# Patient Record
Sex: FEMALE | Race: WHITE | Employment: OTHER | URBAN - METROPOLITAN AREA
[De-identification: names, ages, dates, MRNs, and addresses within clinical notes are randomized per-mention and may not be internally consistent; named-entity substitution may affect disease eponyms.]

---

## 2017-01-09 ENCOUNTER — TELEPHONE (OUTPATIENT)
Dept: FAMILY MEDICINE | Facility: CLINIC | Age: 82
End: 2017-01-09

## 2017-01-09 DIAGNOSIS — J45.30 MILD PERSISTENT ASTHMA WITHOUT COMPLICATION: Primary | ICD-10-CM

## 2017-01-09 NOTE — TELEPHONE ENCOUNTER
Please do not close this encounter until this has been addressed.  (prior auth approved/denied, prescriber refusal to complete prior auth or medication changed/discontinued)    Prior Authorization needed on: Asmanex 220mcg 120 doses  Drug NDC: [address]-63     Insurance: [employer] Part D  Member ID: [id number]   Insurance phone #: [phone]    Pharmacy NPI: [id number]  Pharmacy Phone #: [phone]  Pharmacy Fax #: [phone]    Please let us know if the PA gets approved or denied or if medication is changed    [facility] Pharmacy Technician  [facility]  [phone] fax [phone]

## 2017-01-13 RX ORDER — FLUTICASONE PROPIONATE 110 UG/1
2 AEROSOL, METERED RESPIRATORY (INHALATION) 2 TIMES DAILY
Qty: 1 INHALER | Refills: 3 | Status: SHIPPED | OUTPATIENT
Start: 2017-01-13 | End: 2017-05-10

## 2017-01-13 NOTE — TELEPHONE ENCOUNTER
KEEP ENCOUNTER OPEN UNTIL PA APPROVED/DENIED FROM INSURANCE  Patient's Insurance Co: [employer] part D  ID # [id number]  Phone: [phone]  Fax:     Medication and dose requested: Asmanex 220mcg 120 doses    Medications patient has tried and failed :       PA request form filed, fax[facility] and [facility].   Await approval or denial.

## 2017-01-13 NOTE — TELEPHONE ENCOUNTER
Spoke with patient informed her regarding PA denial and contacting insurance for any alternatives per notes below.  Patient should let us know of any alternatives.    CASANDRA Aquino

## 2017-01-13 NOTE — TELEPHONE ENCOUNTER
Pt spoke to ins and found out that the following rx  Is covered under her plan    Flovent Inhaler    [facility] - [place], [place] - [address], [address]

## 2017-01-13 NOTE — TELEPHONE ENCOUNTER
Please let pt know that I changed her asmanex to flovent due to coverage issues  She should ask the pharmacist how to correctly use this inhaler when she picks it up to make sure she knows how to use this right since it is different than the asmanex.  She will have to let me know if she has any trouble with this.

## 2017-01-13 NOTE — TELEPHONE ENCOUNTER
Dennise blakely,  Please call patient and ask her to contact insurance for a list of formulary alternatives

## 2017-02-15 ENCOUNTER — OFFICE VISIT (OUTPATIENT)
Dept: FAMILY MEDICINE | Facility: CLINIC | Age: 82
End: 2017-02-15
Payer: COMMERCIAL

## 2017-02-15 VITALS
BODY MASS INDEX: 27.05 KG/M2 | DIASTOLIC BLOOD PRESSURE: 62 MMHG | HEIGHT: 62 IN | WEIGHT: 147 LBS | TEMPERATURE: 97.7 F | HEART RATE: 66 BPM | OXYGEN SATURATION: 95 % | SYSTOLIC BLOOD PRESSURE: 138 MMHG

## 2017-02-15 DIAGNOSIS — E87.1 HYPONATREMIA: ICD-10-CM

## 2017-02-15 DIAGNOSIS — J45.40 MODERATE PERSISTENT ASTHMA WITHOUT COMPLICATION: Primary | ICD-10-CM

## 2017-02-15 PROCEDURE — 84295 ASSAY OF SERUM SODIUM: CPT | Performed by: INTERNAL MEDICINE

## 2017-02-15 PROCEDURE — 99213 OFFICE O/P EST LOW 20 MIN: CPT | Performed by: PHYSICIAN ASSISTANT

## 2017-02-15 PROCEDURE — 36415 COLL VENOUS BLD VENIPUNCTURE: CPT | Performed by: PHYSICIAN ASSISTANT

## 2017-02-15 RX ORDER — ALBUTEROL SULFATE 90 UG/1
2 AEROSOL, METERED RESPIRATORY (INHALATION) EVERY 6 HOURS PRN
Qty: 1 INHALER | Refills: 5 | Status: SHIPPED | OUTPATIENT
Start: 2017-02-15 | End: 2017-08-14

## 2017-02-15 NOTE — MR AVS SNAPSHOT
"              After Visit Summary   2/15/2017    Isaura Weathers    MRN: [id number]           Patient Information     Date Of Birth          1935        Visit Information        Provider Department      2/15/2017 11:00 AM Malissa Lee PA-C [facility]        Today's Diagnoses     Moderate persistent asthma without complication    -  1    Hyponatremia           Follow-ups after your visit        Who to contact     If you have questions or need follow up information about today's clinic visit or your schedule please contact [facility] directly at [phone].  Normal or non-critical lab and imaging results will be communicated to you by MyChart, letter or phone within 4 business days after the clinic has received the results. If you do not hear from us within 7 days, please contact the clinic through MyChart or phone. If you have a critical or abnormal lab result, we will notify you by phone as soon as possible.  Submit refill requests through [employer] or call your pharmacy and they will forward the refill request to us. Please allow 3 business days for your refill to be completed.          Additional Information About Your Visit        Care EveryWhere ID     This is your Care EveryWhere ID. This could be used by other organizations to access your [facility] medical records  [id number]        Your Vitals Were     Pulse Temperature Height Pulse Oximetry BMI (Body Mass Index)       66 97.7  F (36.5  C) (Oral) 5' 2\" (1.575 m) 95% 26.89 kg/m2        Blood Pressure from Last 3 Encounters:   02/15/17 138/62   11/28/16 132/71   02/29/16 102/56    Weight from Last 3 Encounters:   02/15/17 147 lb (66.7 kg)   11/28/16 140 lb (63.5 kg)   02/22/16 122 lb (55.3 kg)              We Performed the Following     Asthma Action Plan (AAP)          Where to get your medicines      These medications were sent to [facility], [place] - [address]  [address], [facility] [address]     Phone:  [phone]     albuterol 108 (90 BASE) MCG/ACT Inhaler          Primary Care Provider Office Phone # Fax #    Mlaissa Lee PA-C [phone]       [facility] [address]  [facility] [address]        Thank you!     Thank you for choosing [facility]  for your care. Our goal is always to provide you with excellent care. Hearing back from our patients is one way we can continue to improve our services. Please take a few minutes to complete the written survey that you may receive in the mail after your visit with us. Thank you!             Your Updated Medication List - Protect others around you: Learn how to safely use, store and throw away your medicines at www.disposemymeds.org.          This list is accurate as of: 2/15/17 11:14 AM.  Always use your most recent med list.                   Brand Name Dispense Instructions for use    albuterol 108 (90 BASE) MCG/ACT Inhaler    PROAIR HFA/PROVENTIL HFA/VENTOLIN HFA    1 Inhaler    Inhale 2 puffs into the lungs every 6 hours as needed for shortness of breath / dyspnea or wheezing       atenolol 50 MG tablet    TENORMIN    90 tablet    Take 1 tablet (50 mg) by mouth daily       fluticasone 110 MCG/ACT Inhaler    FLOVENT HFA    1 Inhaler    Inhale 2 puffs into the lungs 2 times daily       hydrochlorothiazide 25 MG tablet    HYDRODIURIL     Take 0.5 tablets (12.5 mg) by mouth daily       levothyroxine 50 MCG tablet    SYNTHROID/LEVOTHROID    90 tablet    Take 1 tablet (50 mcg) by mouth daily

## 2017-02-15 NOTE — LETTER
[place] [address]  [address]  Tel: [phone]    February 16, 2017    Isaura BLAKE Sarahy  [address] [place] [address]        Dear MsKiel Sarahy,    We left a message on your home phone that the results of your recent sodium is now normal.     If you have any further questions or problems, please contact our office.      Sincerely,    Malissa Lee PA-C / monique       Results for orders placed or performed in visit on 02/15/17   Sodium   Result Value Ref Range    Sodium 134 133 - 144 mmol/L

## 2017-02-15 NOTE — NURSING NOTE
"Chief Complaint   Patient presents with     Follow Up For     Asthma, Hypertension, Hypothyroid       Initial /66 (BP Location: Right arm, Patient Position: Chair, Cuff Size: Adult Large)  Pulse 66  Temp 97.7  F (36.5  C) (Oral)  Ht 5' 2\" (1.575 m)  Wt 147 lb (66.7 kg)  SpO2 95%  BMI 26.89 kg/m2 Estimated body mass index is 26.89 kg/(m^2) as calculated from the following:    Height as of this encounter: 5' 2\" (1.575 m).    Weight as of this encounter: 147 lb (66.7 kg).  Medication Reconciliation: complete.  Kindra Lees CMA      "

## 2017-02-15 NOTE — PROGRESS NOTES
HPI: 82 yo here for f/u asthma and HTN  Her insurance stopped covering Asmanex so she was switched to Flovent and notes her asthma is improved with less of the nighttime cough  She is using her albuterol inh prn pascual if outside    She had low sodium so we decr her hctz to 1/2 tab  BP at home was 127/80 at [facility] on the lower dose of HCTZ  She is feeling well without concerns.    Past Medical History   Diagnosis Date     Ankle fracture      HTN (hypertension), benign      Hyperlipidemia LDL goal < 130 1/27/2014     Hypothyroid      Moderate persistent asthma      Pneumonia      Rib fracture      from coughing     SBO (small bowel obstruction) (H) 2/2016     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     Past Surgical History   Procedure Laterality Date     Laparoscopy diagnostic (general) N/A 2/22/2016     Procedure: LAPAROSCOPY DIAGNOSTIC (GENERAL);  Surgeon: Phill Hansen MD;  Location: [facility] OR     Laparotomy, lysis adhesions, combined N/A 2/22/2016     Procedure: COMBINED LAPAROTOMY, LYSIS ADHESIONS;  Surgeon: Phill Hansen MD;  Location:  OR     Resect small bowel without ostomy N/A 2/22/2016     Procedure: RESECT SMALL BOWEL WITHOUT OSTOMY;  Surgeon: Phill Hansen MD;  Location:  OR     Social History   Substance Use Topics     Smoking status: Never Smoker     Smokeless tobacco: Never Used     Alcohol use 0.0 - 0.6 oz/week     0 - 1 Standard drinks or equivalent per week      Comment: one/d     Current Outpatient Prescriptions   Medication Sig Dispense Refill     albuterol (PROAIR HFA/PROVENTIL HFA/VENTOLIN HFA) 108 (90 BASE) MCG/ACT Inhaler Inhale 2 puffs into the lungs every 6 hours as needed for shortness of breath / dyspnea or wheezing 1 Inhaler 5     fluticasone (FLOVENT HFA) 110 MCG/ACT Inhaler Inhale 2 puffs into the lungs 2 times daily 1 Inhaler 3     hydrochlorothiazide (HYDRODIURIL) 25 MG tablet Take 0.5 tablets (12.5 mg) by mouth daily       atenolol (TENORMIN) 50 MG tablet  "Take 1 tablet (50 mg) by mouth daily 90 tablet 3     levothyroxine (SYNTHROID, LEVOTHROID) 50 MCG tablet Take 1 tablet (50 mcg) by mouth daily 90 tablet 3     [DISCONTINUED] albuterol (PROAIR HFA, PROVENTIL HFA, VENTOLIN HFA) 108 (90 BASE) MCG/ACT inhaler Inhale 2 puffs into the lungs every 6 hours as needed for shortness of breath / dyspnea or wheezing 1 Inhaler 5     Allergies   Allergen Reactions     Benicar [Olmesartan Medoxomil]      Robitussin Cough-Allergy      Pt does not recall a problem      FAMILY HISTORY NOTED AND REVIEWED  PHYSICAL EXAM:    /62  Pulse 66  Temp 97.7  F (36.5  C) (Oral)  Ht 5' 2\" (1.575 m)  Wt 147 lb (66.7 kg)  SpO2 95%  BMI 26.89 kg/m2    Patient appears non toxic    Assessment and Plan:     (J45.40) Moderate persistent asthma without complication  (primary encounter diagnosis)  Comment: asthma control even better with switch from Asmanex to Flovent.  Plan: albuterol (PROAIR HFA/PROVENTIL HFA/VENTOLIN         HFA) 108 (90 BASE) MCG/ACT Inhaler            (E87.1) Hyponatremia  Comment: BP well controlled on lower dose of hctz so cont same dose for now.  Plan: Na today      Malissa Lee PA-C      "

## 2017-02-16 LAB — SODIUM SERPL-SCNC: 134 MMOL/L (ref 133–144)

## 2017-02-16 ASSESSMENT — ASTHMA QUESTIONNAIRES: ACT_TOTALSCORE: 21

## 2017-03-22 ENCOUNTER — OFFICE VISIT (OUTPATIENT)
Dept: FAMILY MEDICINE | Facility: CLINIC | Age: 82
End: 2017-03-22
Payer: COMMERCIAL

## 2017-03-22 VITALS
HEART RATE: 61 BPM | TEMPERATURE: 97 F | SYSTOLIC BLOOD PRESSURE: 138 MMHG | OXYGEN SATURATION: 96 % | BODY MASS INDEX: 26.87 KG/M2 | DIASTOLIC BLOOD PRESSURE: 67 MMHG | WEIGHT: 146 LBS | HEIGHT: 62 IN

## 2017-03-22 DIAGNOSIS — I10 HTN (HYPERTENSION), BENIGN: ICD-10-CM

## 2017-03-22 PROCEDURE — 99212 OFFICE O/P EST SF 10 MIN: CPT | Performed by: PHYSICIAN ASSISTANT

## 2017-03-22 RX ORDER — HYDROCHLOROTHIAZIDE 25 MG/1
12.5 TABLET ORAL DAILY
Qty: 45 TABLET | Refills: 3 | Status: SHIPPED | OUTPATIENT
Start: 2017-03-22 | End: 2018-04-05

## 2017-03-22 NOTE — NURSING NOTE
"Chief Complaint   Patient presents with     Follow Up For     Moderate persistent asthma without complication        Initial /67 (BP Location: Right arm, Patient Position: Chair, Cuff Size: Adult Large)  Pulse 61  Temp 97  F (36.1  C) (Oral)  Ht 5' 2\" (1.575 m)  Wt 146 lb (66.2 kg)  SpO2 96%  BMI 26.7 kg/m2 Estimated body mass index is 26.7 kg/(m^2) as calculated from the following:    Height as of this encounter: 5' 2\" (1.575 m).    Weight as of this encounter: 146 lb (66.2 kg).  Medication Reconciliation: complete.      Kindra Lees CMA      "

## 2017-03-22 NOTE — MR AVS SNAPSHOT
"              After Visit Summary   3/22/2017    Isaura Weathers    MRN: [id number]           Patient Information     Date Of Birth          1935        Visit Information        Provider Department      3/22/2017 1:30 PM Malissa Lee PA-C [facility]        Today's Diagnoses     HTN (hypertension), benign           Follow-ups after your visit        Who to contact     If you have questions or need follow up information about today's clinic visit or your schedule please contact [facility] directly at [phone].  Normal or non-critical lab and imaging results will be communicated to you by MyChart, letter or phone within 4 business days after the clinic has received the results. If you do not hear from us within 7 days, please contact the clinic through MyChart or phone. If you have a critical or abnormal lab result, we will notify you by phone as soon as possible.  Submit refill requests through [employer] or call your pharmacy and they will forward the refill request to us. Please allow 3 business days for your refill to be completed.          Additional Information About Your Visit        Care EveryWhere ID     This is your Care EveryWhere ID. This could be used by other organizations to access your [facility] medical records  [id number]        Your Vitals Were     Pulse Temperature Height Pulse Oximetry BMI (Body Mass Index)       61 97  F (36.1  C) (Oral) 5' 2\" (1.575 m) 96% 26.7 kg/m2        Blood Pressure from Last 3 Encounters:   03/22/17 138/67   02/15/17 138/62   11/28/16 132/71    Weight from Last 3 Encounters:   03/22/17 146 lb (66.2 kg)   02/15/17 147 lb (66.7 kg)   11/28/16 140 lb (63.5 kg)              Today, you had the following     No orders found for display         Where to get your medicines      These medications were sent to [facility] [place], [place] - [address], [address]  [address], [place] [address]     Phone:  " [phone]     hydrochlorothiazide 25 MG tablet          Primary Care Provider Office Phone # Fax #    Malissa Lee PA-C [phone]       [facility] [address]  [facility] [address]        Thank you!     Thank you for choosing [facility]  for your care. Our goal is always to provide you with excellent care. Hearing back from our patients is one way we can continue to improve our services. Please take a few minutes to complete the written survey that you may receive in the mail after your visit with us. Thank you!             Your Updated Medication List - Protect others around you: Learn how to safely use, store and throw away your medicines at www.disposemymeds.org.          This list is accurate as of: 3/22/17  1:33 PM.  Always use your most recent med list.                   Brand Name Dispense Instructions for use    albuterol 108 (90 BASE) MCG/ACT Inhaler    PROAIR HFA/PROVENTIL HFA/VENTOLIN HFA    1 Inhaler    Inhale 2 puffs into the lungs every 6 hours as needed for shortness of breath / dyspnea or wheezing       atenolol 50 MG tablet    TENORMIN    90 tablet    Take 1 tablet (50 mg) by mouth daily       fluticasone 110 MCG/ACT Inhaler    FLOVENT HFA    1 Inhaler    Inhale 2 puffs into the lungs 2 times daily       hydrochlorothiazide 25 MG tablet    HYDRODIURIL    45 tablet    Take 0.5 tablets (12.5 mg) by mouth daily       levothyroxine 50 MCG tablet    SYNTHROID/LEVOTHROID    90 tablet    Take 1 tablet (50 mcg) by mouth daily

## 2017-03-22 NOTE — PROGRESS NOTES
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     Past Surgical History:   Procedure Laterality Date     LAPAROSCOPY DIAGNOSTIC (GENERAL) N/A 2/22/2016    Procedure: LAPAROSCOPY DIAGNOSTIC (GENERAL);  Surgeon: Phill Hansen MD;  Location: [facility] OR     LAPAROTOMY, LYSIS ADHESIONS, COMBINED N/A 2/22/2016    Procedure: COMBINED LAPAROTOMY, LYSIS ADHESIONS;  Surgeon: Phill Hansen MD;  Location: [facility] OR     RESECT SMALL BOWEL WITHOUT OSTOMY N/A 2/22/2016    Procedure: RESECT SMALL BOWEL WITHOUT OSTOMY;  Surgeon: Phill Hansen MD;  Location:  OR     Social History   Substance Use Topics     Smoking status: Never Smoker     Smokeless tobacco: Never Used     Alcohol use 0.0 - 0.6 oz/week     0 - 1 Standard drinks or equivalent per week      Comment: one/d     Current Outpatient Prescriptions   Medication Sig Dispense Refill     hydrochlorothiazide (HYDRODIURIL) 25 MG tablet Take 0.5 tablets (12.5 mg) by mouth daily 45 tablet 3     albuterol (PROAIR HFA/PROVENTIL HFA/VENTOLIN HFA) 108 (90 BASE) MCG/ACT Inhaler Inhale 2 puffs into the lungs every 6 hours as needed for shortness of breath / dyspnea or wheezing 1 Inhaler 5     fluticasone (FLOVENT HFA) 110 MCG/ACT Inhaler Inhale 2 puffs into the lungs 2 times daily 1 Inhaler 3     atenolol (TENORMIN) 50 MG tablet Take 1 tablet (50 mg) by mouth daily 90 tablet 3     levothyroxine (SYNTHROID, LEVOTHROID) 50 MCG tablet Take 1 tablet (50 mcg) by mouth daily 90 tablet 3      "[DISCONTINUED] hydrochlorothiazide (HYDRODIURIL) 25 MG tablet Take 0.5 tablets (12.5 mg) by mouth daily       Allergies   Allergen Reactions     Benicar [Olmesartan Medoxomil]      Robitussin Cough-Allergy      Pt does not recall a problem        PHYSICAL EXAM:    /67 (BP Location: Right arm, Patient Position: Chair, Cuff Size: Adult Large)  Pulse 61  Temp 97  F (36.1  C) (Oral)  Ht 5' 2\" (1.575 m)  Wt 146 lb (66.2 kg)  SpO2 96%  BMI 26.7 kg/m2    Patient appears non toxic    Assessment and Plan:     (I10) HTN (hypertension), benign  Comment: well controlled, cont same  Plan: hydrochlorothiazide (HYDRODIURIL) 25 MG tablet        refilled      Malissa Lee PA-C        "

## 2017-03-23 ASSESSMENT — ASTHMA QUESTIONNAIRES: ACT_TOTALSCORE: 23

## 2017-05-10 DIAGNOSIS — J45.30 MILD PERSISTENT ASTHMA WITHOUT COMPLICATION: ICD-10-CM

## 2017-05-10 RX ORDER — DEXAMETHASONE 4 MG/1
TABLET ORAL
Qty: 12 G | Refills: 3 | Status: SHIPPED | OUTPATIENT
Start: 2017-05-10 | End: 2017-09-14

## 2017-05-10 NOTE — TELEPHONE ENCOUNTER
Prescription approved per [facility] Refill Protocol.  Nikole Barone RN

## 2017-05-10 NOTE — TELEPHONE ENCOUNTER
Pending Prescriptions:                       Disp   Refills    FLOVENT  MCG/ACT Inhaler [Pharmacy *12 g   3        Sig: INHALE 2 PUFFS INTO THE LUNGS TWO TIMES A DAY           Last Written Prescription Date: 01/13/2017  Last Fill Quantity: 1, # refills: 3    Last Office Visit with [facility], [facility] or [facility] prescribing provider:  03/22/2017   Future Office Visit:       Date of Last Asthma Action Plan Letter:   Asthma Action Plan Q1 Year    Topic Date Due     Asthma Action Plan - yearly  02/15/2018      Asthma Control Test:   ACT Total Scores 3/22/2017   ACT TOTAL SCORE (Goal Greater than or Equal to 20) 23   In the past 12 months, how many times did you visit the emergency room for your asthma without being admitted to the hospital? 0   In the past 12 months, how many times were you hospitalized overnight because of your asthma? 0       Date of Last Spirometry Test:   No results found for this or any previous visit.

## 2017-07-13 ENCOUNTER — OFFICE VISIT (OUTPATIENT)
Dept: FAMILY MEDICINE | Facility: CLINIC | Age: 82
End: 2017-07-13
Payer: COMMERCIAL

## 2017-07-13 VITALS
BODY MASS INDEX: 27.38 KG/M2 | HEIGHT: 62 IN | HEART RATE: 61 BPM | OXYGEN SATURATION: 96 % | TEMPERATURE: 97.2 F | WEIGHT: 148.8 LBS | SYSTOLIC BLOOD PRESSURE: 126 MMHG | DIASTOLIC BLOOD PRESSURE: 60 MMHG

## 2017-07-13 DIAGNOSIS — I10 HTN (HYPERTENSION), BENIGN: ICD-10-CM

## 2017-07-13 DIAGNOSIS — J45.40 MODERATE PERSISTENT ASTHMA WITHOUT COMPLICATION: Primary | ICD-10-CM

## 2017-07-13 DIAGNOSIS — E03.9 HYPOTHYROIDISM, UNSPECIFIED TYPE: ICD-10-CM

## 2017-07-13 PROCEDURE — 84443 ASSAY THYROID STIM HORMONE: CPT | Performed by: PHYSICIAN ASSISTANT

## 2017-07-13 PROCEDURE — 36415 COLL VENOUS BLD VENIPUNCTURE: CPT | Performed by: PHYSICIAN ASSISTANT

## 2017-07-13 PROCEDURE — 80048 BASIC METABOLIC PNL TOTAL CA: CPT | Performed by: PHYSICIAN ASSISTANT

## 2017-07-13 PROCEDURE — 99214 OFFICE O/P EST MOD 30 MIN: CPT | Performed by: PHYSICIAN ASSISTANT

## 2017-07-13 NOTE — NURSING NOTE
"Chief Complaint   Patient presents with     Asthma        Initial /65 (BP Location: Right arm, Patient Position: Chair, Cuff Size: Adult Regular)  Pulse 61  Temp 97.2  F (36.2  C) (Oral)  Ht 5' 2\" (1.575 m)  Wt 148 lb 12.8 oz (67.5 kg)  SpO2 96%  BMI 27.22 kg/m2 Estimated body mass index is 27.22 kg/(m^2) as calculated from the following:    Height as of this encounter: 5' 2\" (1.575 m).    Weight as of this encounter: 148 lb 12.8 oz (67.5 kg)..    BP completed using cuff size: regular  MEDICATIONS REVIEWED  SOCIAL AND FAMILY HX REVIEWED  Claudette Farah CMA  "

## 2017-07-13 NOTE — LETTER
[facility]  [address] [facility] [address]  [place]  [address]  Tel: [phone]    July 17, 2017    Isaura BLAKE Luistiki  [address] [place] [phone]        Dear Ms. Weathers,     It was a pleasure seeing you.  I wanted to get back to you with your test results.  I have enclosed a copy for your records.    Your thyroid (TSH), blood sugar, electrolytes, kidney functions were all normal.    Malissa Lee PA-C/ monique      Results for orders placed or performed in visit on 07/13/17   TSH with free T4 reflex   Result Value Ref Range    TSH 1.87 0.40 - 4.00 mU/L   Basic metabolic panel   Result Value Ref Range    Sodium 135 133 - 144 mmol/L    Potassium 3.8 3.4 - 5.3 mmol/L    Chloride 97 94 - 109 mmol/L    Carbon Dioxide 28 20 - 32 mmol/L    Anion Gap 10 3 - 14 mmol/L    Glucose 91 70 - 99 mg/dL    Urea Nitrogen 8 7 - 30 mg/dL    Creatinine 0.67 0.52 - 1.04 mg/dL    GFR Estimate 85 >60 mL/min/1.7m2    GFR Estimate If Black >90   GFR Calc   >60 mL/min/1.7m2    Calcium 9.3 8.5 - 10.1 mg/dL

## 2017-07-13 NOTE — MR AVS SNAPSHOT
"              After Visit Summary   2017    Isaura Weathers    MRN: [id number]           Patient Information     Date Of Birth          1935        Visit Information        Provider Department      2017 1:00 PM Malissa Lee PA-C [facility]        Today's Diagnoses     Moderate persistent asthma without complication    -  1    HTN (hypertension), benign        Hypothyroidism, unspecified type           Follow-ups after your visit        Who to contact     If you have questions or need follow up information about today's clinic visit or your schedule please contact [facility] directly at [phone].  Normal or non-critical lab and imaging results will be communicated to you by [employer]hart, letter or phone within 4 business days after the clinic has received the results. If you do not hear from us within 7 days, please contact the clinic through [employer]hart or phone. If you have a critical or abnormal lab result, we will notify you by phone as soon as possible.  Submit refill requests through Segment or call your pharmacy and they will forward the refill request to us. Please allow 3 business days for your refill to be completed.          Additional Information About Your Visit        MyChart Information     Segment lets you send messages to your doctor, view your test results, renew your prescriptions, schedule appointments and more. To sign up, go to [email or web address] . Click on \"Log in\" on the left side of the screen, which will take you to the Welcome page. Then click on \"Sign up Now\" on the right side of the page.     You will be asked to enter the access code listed below, as well as some personal information. Please follow the directions to create your username and password.     Your access code is: [email or web address]  Expires: 10/11/2017  1:21 PM     Your access code will  in 90 days. If you need help or a new code, please call your [facility] or [phone].   " "     Care EveryWhere ID     This is your Care EveryWhere ID. This could be used by other organizations to access your [facility] medical records  [id number]        Your Vitals Were     Pulse Temperature Height Pulse Oximetry BMI (Body Mass Index)       61 97.2  F (36.2  C) (Oral) 5' 2\" (1.575 m) 96% 27.22 kg/m2        Blood Pressure from Last 3 Encounters:   07/13/17 126/60   03/22/17 138/67   02/15/17 138/62    Weight from Last 3 Encounters:   07/13/17 148 lb 12.8 oz (67.5 kg)   03/22/17 146 lb (66.2 kg)   02/15/17 147 lb (66.7 kg)              We Performed the Following     Basic metabolic panel     TSH with free T4 reflex        Primary Care Provider Office Phone # Fax #    Malissa Lee PA-C [phone]       [facility] [address]  [facility] [address]        Equal Access to Services     TAWANA DIANE : Hadii aad ku hadasho Soomaali, waaxda luqadaha, qaybta kaalmada adeegyada, waxay idiin hayvillan ramón martins . So [facility] [phone].    ATENCIÓN: Si habla español, tiene a sexton disposición servicios gratuitos de asistencia lingüística. [facility] [phone].    We comply with applicable federal civil rights laws and [place] laws. We do not discriminate on the basis of race, color, national origin, age, disability sex, sexual orientation or gender identity.            Thank you!     Thank you for choosing [facility]  for your care. Our goal is always to provide you with excellent care. Hearing back from our patients is one way we can continue to improve our services. Please take a few minutes to complete the written survey that you may receive in the mail after your visit with us. Thank you!             Your Updated Medication List - Protect others around you: Learn how to safely use, store and throw away your medicines at www.disposemymeds.org.          This list is accurate as of: 7/13/17  1:21 PM.  Always use your most recent med list.                   " Brand Name Dispense Instructions for use Diagnosis    albuterol 108 (90 BASE) MCG/ACT Inhaler    PROAIR HFA/PROVENTIL HFA/VENTOLIN HFA    1 Inhaler    Inhale 2 puffs into the lungs every 6 hours as needed for shortness of breath / dyspnea or wheezing    Moderate persistent asthma without complication       atenolol 50 MG tablet    TENORMIN    90 tablet    Take 1 tablet (50 mg) by mouth daily    HTN (hypertension), benign       FLOVENT  MCG/ACT Inhaler   Generic drug:  fluticasone     12 g    INHALE 2 PUFFS INTO THE LUNGS TWO TIMES A DAY    Mild persistent asthma without complication       hydrochlorothiazide 25 MG tablet    HYDRODIURIL    45 tablet    Take 0.5 tablets (12.5 mg) by mouth daily    HTN (hypertension), benign       levothyroxine 50 MCG tablet    SYNTHROID/LEVOTHROID    90 tablet    Take 1 tablet (50 mcg) by mouth daily    Hypothyroidism, unspecified type

## 2017-07-13 NOTE — PROGRESS NOTES
HPI: 82 yo female here for f/u asthma and HTN  She is compliant with taking her medications and feels well  Denies sob, wheezing or cough  She only needs to use her rescue inhaler if out in the wind  She has more pedal edema since the hctz dose was decr due to hyponatremia  She brings in a list of BP readings which shows good BP control.    Past Medical History:   Diagnosis Date     Ankle fracture      HTN (hypertension), benign      Hyperlipidemia LDL goal < 130 1/27/2014     Hypothyroid      Moderate persistent asthma      Pneumonia      Rib fracture     from coughing     SBO (small bowel obstruction) (H) 2/2016    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     Past Surgical History:   Procedure Laterality Date     LAPAROSCOPY DIAGNOSTIC (GENERAL) N/A 2/22/2016    Procedure: LAPAROSCOPY DIAGNOSTIC (GENERAL);  Surgeon: Phill Hansen MD;  Location: [facility] OR     LAPAROTOMY, LYSIS ADHESIONS, COMBINED N/A 2/22/2016    Procedure: COMBINED LAPAROTOMY, LYSIS ADHESIONS;  Surgeon: Phill Hansen MD;  Location:  OR     RESECT SMALL BOWEL WITHOUT OSTOMY N/A 2/22/2016    Procedure: RESECT SMALL BOWEL WITHOUT OSTOMY;  Surgeon: Phill Hansen MD;  Location:  OR     Social History   Substance Use Topics     Smoking status: Never Smoker     Smokeless tobacco: Never Used     Alcohol use 0.0 - 0.6 oz/week     0 - 1 Standard drinks or equivalent per week      Comment: one/d     Current Outpatient Prescriptions   Medication Sig Dispense Refill     FLOVENT  MCG/ACT Inhaler INHALE 2 PUFFS INTO THE LUNGS TWO TIMES A DAY 12 g 3     hydrochlorothiazide (HYDRODIURIL) 25 MG tablet Take 0.5 tablets (12.5 mg) by mouth daily 45 tablet 3     albuterol (PROAIR HFA/PROVENTIL HFA/VENTOLIN HFA) 108 (90 BASE) MCG/ACT Inhaler Inhale 2 puffs into the lungs every 6 hours as needed for shortness of breath / dyspnea or wheezing 1 Inhaler 5     atenolol (TENORMIN) 50 MG tablet Take 1 tablet (50 mg) by mouth daily 90  "tablet 3     levothyroxine (SYNTHROID, LEVOTHROID) 50 MCG tablet Take 1 tablet (50 mcg) by mouth daily 90 tablet 3     Allergies   Allergen Reactions     Benicar [Olmesartan Medoxomil]      Robitussin Cough-Allergy      Pt does not recall a problem        PHYSICAL EXAM:    /60  Pulse 61  Temp 97.2  F (36.2  C) (Oral)  Ht 5' 2\" (1.575 m)  Wt 148 lb 12.8 oz (67.5 kg)  SpO2 96%  BMI 27.22 kg/m2    Patient appears non toxic  Lungs: CTA bilat without wheezing or rhonchi  Heart: RRR without m/r/g.  Extr: trace pitting pedal edema bilat  Psych: approp affect and mood.    Assessment and Plan:     (J45.40) Moderate persistent asthma without complication  (primary encounter diagnosis)  Comment: stable  Plan:  Cont same. ACT done today    (I10) HTN (hypertension), benign  Comment: well controlled on recheck, cont same.  Plan: Basic metabolic panel            (E03.9) Hypothyroidism, unspecified type  Comment:   Plan: TSH with free T4 reflex              Malissa Lee PA-C        "

## 2017-07-14 LAB
ANION GAP SERPL CALCULATED.3IONS-SCNC: 10 MMOL/L (ref 3–14)
BUN SERPL-MCNC: 8 MG/DL (ref 7–30)
CALCIUM SERPL-MCNC: 9.3 MG/DL (ref 8.5–10.1)
CHLORIDE SERPL-SCNC: 97 MMOL/L (ref 94–109)
CO2 SERPL-SCNC: 28 MMOL/L (ref 20–32)
CREAT SERPL-MCNC: 0.67 MG/DL (ref 0.52–1.04)
GFR SERPL CREATININE-BSD FRML MDRD: 85 ML/MIN/1.7M2
GLUCOSE SERPL-MCNC: 91 MG/DL (ref 70–99)
POTASSIUM SERPL-SCNC: 3.8 MMOL/L (ref 3.4–5.3)
SODIUM SERPL-SCNC: 135 MMOL/L (ref 133–144)
TSH SERPL DL<=0.005 MIU/L-ACNC: 1.87 MU/L (ref 0.4–4)

## 2017-07-14 ASSESSMENT — ASTHMA QUESTIONNAIRES: ACT_TOTALSCORE: 23

## 2017-07-15 NOTE — PROGRESS NOTES
It was a pleasure seeing you.  I wanted to get back to you with your test results.  I have enclosed a copy for your records.    Your thyroid (TSH), blood sugar, electrolytes, kidney functions were all normal.    Malissa Lee PA-C

## 2017-08-14 DIAGNOSIS — J45.40 MODERATE PERSISTENT ASTHMA WITHOUT COMPLICATION: ICD-10-CM

## 2017-08-14 NOTE — TELEPHONE ENCOUNTER
albuterol (PROAIR HFA/PROVENTIL HFA/VENTOLIN HFA) 108 (90 BASE) MCG/ACT Inhaler 1 Inhaler 5 2/15/2017  No   Sig: Inhale 2 puffs into the lungs every 6 hours as needed for shortness of breath / dyspnea or wheezing            Last Written Prescription Date: 02/15/2017  Last Fill Quantity: 1 , # refills: 1    Last Office Visit with [facility], [facility] or [facility] prescribing provider:  07/13/2017   Future Office Visit:       Date of Last Asthma Action Plan Letter:   Asthma Action Plan Q1 Year    Topic Date Due     Asthma Action Plan - yearly  02/15/2018      Asthma Control Test:   ACT Total Scores 7/13/2017   ACT TOTAL SCORE -   ASTHMA ER VISITS -   ASTHMA HOSPITALIZATIONS -   ACT TOTAL SCORE (Goal Greater than or Equal to 20) 23   In the past 12 months, how many times did you visit the emergency room for your asthma without being admitted to the hospital? 0   In the past 12 months, how many times were you hospitalized overnight because of your asthma? 0       Date of Last Spirometry Test:   No results found for this or any previous visit.

## 2017-08-15 RX ORDER — ALBUTEROL SULFATE 90 UG/1
AEROSOL, METERED RESPIRATORY (INHALATION)
Qty: 18 G | Refills: 4 | Status: SHIPPED | OUTPATIENT
Start: 2017-08-15 | End: 2018-10-09

## 2017-08-15 NOTE — TELEPHONE ENCOUNTER
Prescription approved per [facility] Refill Protocol.    Taylor Napier RN   [facility]

## 2017-09-14 DIAGNOSIS — J45.30 MILD PERSISTENT ASTHMA WITHOUT COMPLICATION: ICD-10-CM

## 2017-09-14 RX ORDER — DEXAMETHASONE 4 MG/1
TABLET ORAL
Qty: 12 G | Refills: 1 | Status: SHIPPED | OUTPATIENT
Start: 2017-09-14 | End: 2017-11-13

## 2017-09-14 NOTE — TELEPHONE ENCOUNTER
FLOVENT  MCG/ACT Inhaler         Last Written Prescription Date: 5/10/2017  Last Fill Quantity: 12g, # refills: 3    Last Office Visit with [facility], [facility] or [facility] prescribing provider:  7/13/2017   Future Office Visit:       Date of Last Asthma Action Plan Letter:   Asthma Action Plan Q1 Year    Topic Date Due     Asthma Action Plan - yearly  02/15/2018      Asthma Control Test:   ACT Total Scores 7/13/2017   ACT TOTAL SCORE -   ASTHMA ER VISITS -   ASTHMA HOSPITALIZATIONS -   ACT TOTAL SCORE (Goal Greater than or Equal to 20) 23   In the past 12 months, how many times did you visit the emergency room for your asthma without being admitted to the hospital? 0   In the past 12 months, how many times were you hospitalized overnight because of your asthma? 0       Date of Last Spirometry Test:   No results found for this or any previous visit.

## 2017-09-14 NOTE — TELEPHONE ENCOUNTER
Prescription approved per [facility] Refill Protocol.    Diana العلي RN

## 2017-11-13 DIAGNOSIS — J45.30 MILD PERSISTENT ASTHMA WITHOUT COMPLICATION: ICD-10-CM

## 2017-11-15 RX ORDER — DEXAMETHASONE 4 MG/1
TABLET ORAL
Qty: 3 INHALER | Refills: 1 | Status: SHIPPED | OUTPATIENT
Start: 2017-11-15 | End: 2018-10-09

## 2017-11-15 NOTE — TELEPHONE ENCOUNTER
Requested Prescriptions   Pending Prescriptions Disp Refills     FLOVENT  MCG/ACT Inhaler [Pharmacy Med Name: FLOVENT HFA 110MCG/ACT AERO] 12 g 1     Sig: INHALE TWO PUFFS INTO THE LUNGS TWO TIMES A DAY    There is no refill protocol information for this order            Date of Last Asthma Action Plan Letter:   Asthma Action Plan Q1 Year    Topic Date Due     Asthma Action Plan - yearly  02/15/2018      Asthma Control Test:   ACT Total Scores 7/13/2017   ACT TOTAL SCORE -   ASTHMA ER VISITS -   ASTHMA HOSPITALIZATIONS -   ACT TOTAL SCORE (Goal Greater than or Equal to 20) 23   In the past 12 months, how many times did you visit the emergency room for your asthma without being admitted to the hospital? 0   In the past 12 months, how many times were you hospitalized overnight because of your asthma? 0       Date of Last Spirometry Test:   No results found for this or any previous visit.        Prescription approved per [facility] Refill Protocol.    Diana Gamboa, BS, RN, PHN  [facility] [phone]

## 2018-02-16 ENCOUNTER — TELEPHONE (OUTPATIENT)
Dept: FAMILY MEDICINE | Facility: CLINIC | Age: 83
End: 2018-02-16

## 2018-02-17 ASSESSMENT — ASTHMA QUESTIONNAIRES: ACT_TOTALSCORE: 25

## 2018-04-05 DIAGNOSIS — I10 HTN (HYPERTENSION), BENIGN: ICD-10-CM

## 2018-04-05 RX ORDER — HYDROCHLOROTHIAZIDE 25 MG/1
TABLET ORAL
Qty: 45 TABLET | Refills: 0 | Status: SHIPPED | OUTPATIENT
Start: 2018-04-05 | End: 2018-06-22

## 2018-04-05 RX ORDER — ATENOLOL 50 MG/1
TABLET ORAL
Qty: 90 TABLET | Refills: 0 | Status: SHIPPED | OUTPATIENT
Start: 2018-04-05 | End: 2018-07-05

## 2018-04-05 NOTE — TELEPHONE ENCOUNTER
Prescription approved per [facility] Refill Protocol.  Ramona Mauro RN

## 2018-04-05 NOTE — TELEPHONE ENCOUNTER
"Atenolol (TENORMIN)    Last Written Prescription Date:  11/28/16  Last Fill Quantity: 90 tablets,  # refills: 3   Last office visit: 7/13/2017 with prescribing provider:  Malissa Chaudhari Office Visit:      Hydrochlorothiazide (HYDRODIURIL)    Last Written Prescription Date:  3/22/17  Last Fill Quantity: 45 tablets,  # refills: 3   Last office visit: 7/13/2017 with prescribing provider:  Malissa Chaudhari Office Visit:      Requested Prescriptions   Pending Prescriptions Disp Refills     atenolol (TENORMIN) 50 MG tablet [Pharmacy Med Name: ATENOLOL 50MG TABS] 90 tablet 3     Sig: TAKE ONE TABLET BY MOUTH EVERY DAY    Beta-Blockers Protocol Passed    4/5/2018 11:28 AM       Passed - Blood pressure under 140/90 in past 12 months    BP Readings from Last 3 Encounters:   07/13/17 126/60   03/22/17 138/67   02/15/17 138/62                Passed - Patient is age 6 or older       Passed - Recent (12 mo) or future (30 days) visit within the authorizing provider's specialty    Patient had office visit in the last 12 months or has a visit in the next 30 days with authorizing provider or within the authorizing provider's specialty.  See \"Patient Info\" tab in inbasket, or \"Choose Columns\" in Meds & Orders section of the refill encounter.            hydrochlorothiazide (HYDRODIURIL) 25 MG tablet [Pharmacy Med Name: HYDROCHLOROTHIAZIDE 25MG TABS] 45 tablet 3     Sig: TAKE ONE-HALF TABLET BY MOUTH EVERY DAY    Diuretics (Including Combos) Protocol Passed    4/5/2018 11:28 AM       Passed - Blood pressure under 140/90 in past 12 months    BP Readings from Last 3 Encounters:   07/13/17 126/60   03/22/17 138/67   02/15/17 138/62                Passed - Recent (12 mo) or future (30 days) visit within the authorizing provider's specialty    Patient had office visit in the last 12 months or has a visit in the next 30 days with authorizing provider or within the authorizing provider's specialty.  See \"Patient Info\" tab in inbasket, or " "\"Choose Columns\" in Meds & Orders section of the refill encounter.           Passed - Patient is age 18 or older       Passed - No active pregancy on record       Passed - Normal serum creatinine on file in past 12 months    Recent Labs   Lab Test  07/13/17   1325   CR  0.67             Passed - Normal serum potassium on file in past 12 months    Recent Labs   Lab Test  07/13/17   1325   POTASSIUM  3.8                   Passed - Normal serum sodium on file in past 12 months    Recent Labs   Lab Test  07/13/17   1325   NA  135             Passed - No positive pregnancy test in past 12 months          "

## 2018-06-22 DIAGNOSIS — I10 HTN (HYPERTENSION), BENIGN: ICD-10-CM

## 2018-06-22 RX ORDER — HYDROCHLOROTHIAZIDE 25 MG/1
TABLET ORAL
Qty: 45 TABLET | Refills: 0 | Status: SHIPPED | OUTPATIENT
Start: 2018-06-22 | End: 2018-09-17

## 2018-06-22 NOTE — TELEPHONE ENCOUNTER
Prescription approved per [facility] Refill Protocol.  Karuna CORONADO RN

## 2018-06-22 NOTE — TELEPHONE ENCOUNTER
"Requested Prescriptions   Pending Prescriptions Disp Refills     hydrochlorothiazide (HYDRODIURIL) 25 MG tablet [Pharmacy Med Name: HYDROCHLOROTHIAZIDE 25MG TABS] 45 tablet 0    Last Written Prescription Date:  4/5/18  Last Fill Quantity: 45,  # refills: 0   Last office visit: 7/13/2017 with prescribing provider:     Future Office Visit:     Sig: TAKE ONE-HALF TABLET BY MOUTH EVERY DAY    Diuretics (Including Combos) Protocol Passed    6/22/2018 10:33 AM       Passed - Blood pressure under 140/90 in past 12 months    BP Readings from Last 3 Encounters:   07/13/17 126/60   03/22/17 138/67   02/15/17 138/62                Passed - Recent (12 mo) or future (30 days) visit within the authorizing provider's specialty    Patient had office visit in the last 12 months or has a visit in the next 30 days with authorizing provider or within the authorizing provider's specialty.  See \"Patient Info\" tab in inbasket, or \"Choose Columns\" in Meds & Orders section of the refill encounter.           Passed - Patient is age 18 or older       Passed - No active pregancy on record       Passed - Normal serum creatinine on file in past 12 months    Recent Labs   Lab Test  07/13/17   1325   CR  0.67             Passed - Normal serum potassium on file in past 12 months    Recent Labs   Lab Test  07/13/17   1325   POTASSIUM  3.8                   Passed - Normal serum sodium on file in past 12 months    Recent Labs   Lab Test  07/13/17   1325   NA  135             Passed - No positive pregnancy test in past 12 months          "

## 2018-07-05 DIAGNOSIS — I10 HTN (HYPERTENSION), BENIGN: ICD-10-CM

## 2018-07-05 NOTE — TELEPHONE ENCOUNTER
"Last Written Prescription Date: 4/05/18  Last Fill Quantity: 90 tablet,  # refills: 0   Last office visit: 7/13/2017 with prescribing provider:  Rosa   Future Office Visit:      Requested Prescriptions   Pending Prescriptions Disp Refills     atenolol (TENORMIN) 50 MG tablet [Pharmacy Med Name: ATENOLOL 50MG TABS] 90 tablet 0     Sig: TAKE ONE TABLET BY MOUTH EVERY DAY    Beta-Blockers Protocol Passed    7/5/2018  4:07 PM       Passed - Blood pressure under 140/90 in past 12 months    BP Readings from Last 3 Encounters:   07/13/17 126/60   03/22/17 138/67   02/15/17 138/62                Passed - Patient is age 6 or older       Passed - Recent (12 mo) or future (30 days) visit within the authorizing provider's specialty    Patient had office visit in the last 12 months or has a visit in the next 30 days with authorizing provider or within the authorizing provider's specialty.  See \"Patient Info\" tab in inbasket, or \"Choose Columns\" in Meds & Orders section of the refill encounter.              "

## 2018-07-07 RX ORDER — ATENOLOL 50 MG/1
TABLET ORAL
Qty: 30 TABLET | Refills: 0 | Status: SHIPPED | OUTPATIENT
Start: 2018-07-07 | End: 2018-09-04

## 2018-07-07 NOTE — TELEPHONE ENCOUNTER
Medication is being filled for 1 time refill only due to:  Patient needs to be seen because it has been more than one year since last visit.  Nikole Barone RN

## 2018-09-04 DIAGNOSIS — I10 HTN (HYPERTENSION), BENIGN: ICD-10-CM

## 2018-09-04 NOTE — TELEPHONE ENCOUNTER
"Last Written Prescription Date:  7/07/18  Last Fill Quantity: 30 tablet,  # refills: 0   Last office visit: 7/13/2017 with prescribing provider:  Rosa   Future Office Visit:      Notes to Pharmacy: Medication is being filled for 1 time refill only due to:  Patient needs to be seen because it has been more than one year since last visit.    Requested Prescriptions   Pending Prescriptions Disp Refills     atenolol (TENORMIN) 50 MG tablet [Pharmacy Med Name: ATENOLOL 50MG TABS] 30 tablet 0     Sig: TAKE ONE TABLET BY MOUTH EVERY DAY    Beta-Blockers Protocol Failed    9/4/2018 11:08 AM       Failed - Blood pressure under 140/90 in past 12 months    BP Readings from Last 3 Encounters:   07/13/17 126/60   03/22/17 138/67   02/15/17 138/62                Failed - Recent (12 mo) or future (30 days) visit within the authorizing provider's specialty    Patient had office visit in the last 12 months or has a visit in the next 30 days with authorizing provider or within the authorizing provider's specialty.  See \"Patient Info\" tab in inbasket, or \"Choose Columns\" in Meds & Orders section of the refill encounter.           Passed - Patient is age 6 or older          "

## 2018-09-05 RX ORDER — ATENOLOL 50 MG/1
TABLET ORAL
Qty: 30 TABLET | Refills: 0 | Status: SHIPPED | OUTPATIENT
Start: 2018-09-05 | End: 2018-10-09

## 2018-09-05 NOTE — TELEPHONE ENCOUNTER
Routing refill request to provider for review/approval because:  Lucía given x1 and patient did not follow up, please advise    Routing to TCs to contact patient to inform due for appointment.    Karuna CORONADO RN

## 2018-10-02 DIAGNOSIS — I10 HTN (HYPERTENSION), BENIGN: ICD-10-CM

## 2018-10-02 RX ORDER — ATENOLOL 50 MG/1
TABLET ORAL
Qty: 30 TABLET | Refills: 0 | Status: CANCELLED | OUTPATIENT
Start: 2018-10-02

## 2018-10-02 NOTE — TELEPHONE ENCOUNTER
"Last Written Prescription Date:  9/05/18  Last Fill Quantity: 30 tablet,  # refills: 0   Last office visit: 7/13/2017 with prescribing provider:  Rosa Chaudhari Office Visit:      Notes to Pharmacy: 1 month refill only; patient due for appointment.    Requested Prescriptions   Pending Prescriptions Disp Refills     atenolol (TENORMIN) 50 MG tablet [Pharmacy Med Name: ATENOLOL 50MG TABS] 30 tablet 0     Sig: TAKE ONE TABLET BY MOUTH EVERY DAY    Beta-Blockers Protocol Failed    10/2/2018  9:52 AM       Failed - Blood pressure under 140/90 in past 12 months    BP Readings from Last 3 Encounters:   07/13/17 126/60   03/22/17 138/67   02/15/17 138/62                Failed - Recent (12 mo) or future (30 days) visit within the authorizing provider's specialty    Patient had office visit in the last 12 months or has a visit in the next 30 days with authorizing provider or within the authorizing provider's specialty.  See \"Patient Info\" tab in inbasket, or \"Choose Columns\" in Meds & Orders section of the refill encounter.           Passed - Patient is age 6 or older          "

## 2018-10-03 NOTE — TELEPHONE ENCOUNTER
TCs,   Patient due for appt with PCP (last OV 7/13/2017)  Please schedule    Once scheduled, please route this TE back to CS Refill  Thank you,  Nelly MUNSON RN

## 2018-10-05 ENCOUNTER — OFFICE VISIT (OUTPATIENT)
Dept: URGENT CARE | Facility: URGENT CARE | Age: 83
End: 2018-10-05
Payer: COMMERCIAL

## 2018-10-05 VITALS
SYSTOLIC BLOOD PRESSURE: 134 MMHG | DIASTOLIC BLOOD PRESSURE: 72 MMHG | HEART RATE: 75 BPM | OXYGEN SATURATION: 95 % | TEMPERATURE: 98.7 F

## 2018-10-05 DIAGNOSIS — J45.21 EXACERBATION OF INTERMITTENT ASTHMA, UNSPECIFIED ASTHMA SEVERITY: Primary | ICD-10-CM

## 2018-10-05 PROCEDURE — 99213 OFFICE O/P EST LOW 20 MIN: CPT

## 2018-10-05 RX ORDER — PREDNISONE 20 MG/1
40 TABLET ORAL DAILY
Qty: 10 TABLET | Refills: 0 | Status: SHIPPED | OUTPATIENT
Start: 2018-10-05 | End: 2018-10-10

## 2018-10-05 RX ORDER — ALBUTEROL SULFATE 0.83 MG/ML
2.5 SOLUTION RESPIRATORY (INHALATION) ONCE
Qty: 3 ML | Refills: 0
Start: 2018-10-05 | End: 2019-12-18

## 2018-10-05 RX ORDER — ALBUTEROL SULFATE 90 UG/1
2 AEROSOL, METERED RESPIRATORY (INHALATION) EVERY 4 HOURS PRN
Qty: 1 INHALER | Refills: 1 | Status: SHIPPED | OUTPATIENT
Start: 2018-10-05 | End: 2019-05-06

## 2018-10-05 ASSESSMENT — ENCOUNTER SYMPTOMS
CONSTITUTIONAL NEGATIVE: 1
SHORTNESS OF BREATH: 1
SORE THROAT: 0
WHEEZING: 1
NEUROLOGICAL NEGATIVE: 1
MUSCULOSKELETAL NEGATIVE: 1
GASTROINTESTINAL NEGATIVE: 1

## 2018-10-05 NOTE — MR AVS SNAPSHOT
After Visit Summary   10/5/2018    Isaura Weathers    MRN: [id number]           Patient Information     Date Of Birth          1935        Visit Information        Provider Department      10/5/2018 5:35 PM [facility]ENT [facility]        Today's Diagnoses     Exacerbation of intermittent asthma, unspecified asthma severity    -  1      Care Instructions      Caring for Your Inhaler  Your healthcare provider may prescribe medicine that you breathe in using a metered-dose inhaler. It is important to keep it clean. You should also keep track of how much medicine is left in the canister so you ll never run out.    Keeping your inhaler clean    Take off the canister, the part with the medicine, and cap from the mouthpiece.    Don't wash the canister or put it in water.    Run warm water through the mouthpiece for about a minute.    Shake off the water and let it air-dry.    If you need to use it before it is dry, shake off any water and replace the canister. Test spray it away from you to make sure it works.    If you use a spacer, clean it with warm water and a small amount of mild dish soap. Do this once every week or two.    Make sure you check the package insert for special instructions. The insert is the information that comes with the medicine. It may tell you how to take care of and clean your spacer.  When to replace your inhaler  Each inhaler is good for only a certain number of puffs of medicine. After those puffs are used up, any puffs left will not give you the amount of medicine you need. To be sure you ll get enough medicine when you need it, keep track of how many puffs you use. Here s an easy way to keep track of the medicine in your inhaler:  1. Find the number on the mouthpiece that tells you how many puffs it contains. Some inhalers have the counter on the mouthpiece instead of the canister. Keep the canister and mouthpiece together so you can keep track of  how many puffs are left.  2. Divide this number by how many puffs you are told to use in one day. This gives you the number of days your medicine should last.  3. Use your calendar to find out what date your medicine will run out. Michael it on the canister and on your calendar.  Be sure to get a refill of your medicines before you run out. Some inhalers have dose counters to track the amount of medicine used.  For example, if your new canister holds 200 puffs and you ve been told to use 4 puffs a day:  200 ÷ 4 = 50 days  Sample for you to fill in:     ____________  Number of puffs in new canister ÷    ____________  Number of puffs you use each day =    ____________  Number of days medicine will last   Note: Remember that your medicine will not last long if you use your inhaler more often than planned.   Date Last Reviewed: 10/1/2016    [id number] [employer]. [address] [place] [address]. All rights reserved. This information is not intended as a substitute for professional medical care. Always follow your healthcare professional's instructions.        Inhaler Use  The inhaler that you were prescribed contains a potent medicine. It should only be used as directed. The medicine in your inhaler must be breathed deeply into your lungs for it to work. It will not work at all if it only reaches your mouth and throat. Follow the instructions below for best results. And remember to follow your asthma action plan as given to you by your doctor.  1. Keep your inhaler at room temperature.  2. Hold the inhaler so that the part that goes into your mouth is at the bottom.  3. Shake the inhaler well and remove the cap.  4. Breathe out through your mouth to fully empty your lungs.  5. Place the inhaler in your mouth and close your lips tightly around it. (Or hold the inhaler 1 to 2 inches from your open mouth if told to do so by your healthcare provider.)  6. Squeeze the inhaler as you breathe in slowly  through your mouth until your lungs are full of air, drawing the medicine deep into your lungs.  7. Hold your breath for 10 seconds, or as long as you can comfortably hold your breath. Then breathe out slowly.  8. If you have been advised to take 2 puffs, wait 5 minutes, then repeat steps 3 to 7 above. Waiting 5 minutes between puffs will allow the medicine to open up your lungs so the second puff can get deeper into the lungs. Replace the cap when done.  9. If you were prescribed both a steroid inhaler and a bronchodilator inhaler, use the bronchodilator first to open the air passages. Wait 5 minutes, then use the steroid inhaler.  10. Rinse your mouth with water and spit it out (especially after using a steroid inhaler). This is very important if you are using a steroid inhaler to prevent thrush, a mild yeast infection of the mouth and back of the throat.  11. A special chamber ( spacer ) may be prescribed that attaches to your inhaler. This increases the amount of medicine that goes to your lungs. It also improves how well each treatment works. Ask your doctor about this if you did not receive one.    Keep it clean  Remove the metal canister and do not immerse it in water. Then clean the plastic mouthpiece, cap, and spacer if you have one, by rinsing them well in warm running water for 30 to 60 seconds. Shake off excess water and allow the mouthpiece to dry completely (overnight is recommended). This should be done once a week. If you need the inhaler before the mouthpiece is dry, shake off excess water, replace the canister, and test spray 2 times (away from the face).  Warning  A steroid inhaler is used to prevent an asthma attack. Do not use this to treat an acute wheezing episode. Use only bronchodilator inhalers (quick relief) to treat an acute asthma attack.  If you find that your medicine is not working and you need to use it more often than prescribed, this could be a sign that your asthma is getting  worse. Go to the emergency room or urgent care right away. An asthma attack is easiest to treat in the early stages before it becomes severe.  When to seek medical advice  Get prompt medical attention if any of the following occur:    Increased wheezing or shortness of breath    Need to use your inhalers more often than usual without relief    Fever of 100.4 F (38 C) or higher, or as directed by your healthcare provider    Coughing up lots of dark-colored or bloody sputum (mucus)    Chest pain with each breath    Blue lips or fingernails    Peak flow reading less than 50% of your normal best  Date Last Reviewed: 5/1/2017 2000-2017 [employer]. [address] [place] [address]. All rights reserved. This information is not intended as a substitute for professional medical care. Always follow your healthcare professional's instructions.        Discharge Instructions for Asthma  You have been diagnosed with an asthma attack. With the help of your healthcare provider, you can keep your asthma under control and have less emergency department visits and stays in the hospital.    Managing asthma    Take your asthma medicines exactly as your provider tells you. Do this even if you feel that your athma is under control.    Learn how to monitor your asthma. Some people watch for early changes of symptoms getting worse. Some use a peak flow meter. Your healthcare provider may decide to give you an asthma action plan.    Be sure to always have a quick-relief inhaler with you. If you were given a prescription, make sure you go to a pharmacy to get it filled as soon as possible.  Controlling asthma triggers  Triggers are those things that make your asthma symptoms worse or cause asthma attacks. Many people with asthma have allergies that can be triggers. Your healthcare provider may have you get allergy testing to find out what you are allergic to. This can help you stay away from triggers.  Dust or dust  mites are a common asthma trigger. To avoid a dust mites, do the following:    Use dust-proof covers on your mattress and pillows. Wash the sheets and blankets on your bed once a week in very hot water.    Don t sleep or lie on cloth-covered cushions or furniture.    Ask someone else to vacuum and dust your house.    If you do vacuum and dust yourself, wear a dust mask. You can buy them from the [employer] store.    Use a vacuum with a double-layered bag or HEPA (high-efficiency particulate air) filter.  Pets with fur or feathers are triggers for some people. If you must have pets, take these precautions:    Keep pets out of your bedroom and off your bed. Keep the bedroom door closed.    Cover the air vents in your bedroom with heavy material to filter the air.    Don't use carpets or cloth-covered furniture in your home. If this is not possible, keep pets out of rooms with these items.    Have someone bathe your pets every week. And brush them often.  If you smoke, do your best to quit.    Enroll in a stop-smoking program to increase your chance of success.    Ask your healthcare provider about medicines or other methods to help you quit.    Ask family members to quit smoking as well.    Don t allow anyone to smoke in your home, in your car, or around you.  Other steps to take    Make sure you know what to do if exercise is a trigger for you. Many people use quick-relief inhalers before exercise or physical activity.    Get a flu shot every year and get pneumonia shots as advised by your healthcare provider.    Try to keep your windows closed during pollen seasons and when mold counts are high.    On cold or windy days, cover your nose and mouth with a scarf.    Try to stay away from people who are sick with colds or the flu. Wash your hands often or use a hand . If respiratory infections like colds or flu trigger your asthma, use your quick-relief medicines as soon as you begin to notice respiratory  "symptoms. They may include a runny or stuffy nose, sore throat, or a cough.  Follow-up care  Make a follow-up appointment as directed. Follow your asthma action plan if you were given one.  Call 911  Call 911 right away if you have:    Severe wheezing    Shortness of breath that is not relieved by your quick-relief medicine    Trouble walking or talking because of shortness of breath    Blue lips or fingernails    If you monitor symptoms with a peak flow meter, readings less than 50% of your personal best   Date Last Reviewed: 2/3/2017    [id number] [employer]. [address] [place] [address]. All rights reserved. This information is not intended as a substitute for professional medical care. Always follow your healthcare professional's instructions.                Follow-ups after your visit        Who to contact     If you have questions or need follow up information about today's clinic visit or your schedule please contact [facility] directly at [phone].  Normal or non-critical lab and imaging results will be communicated to you by [employer], letter or phone within 4 business days after the clinic has received the results. If you do not hear from us within 7 days, please contact the clinic through [employer] or phone. If you have a critical or abnormal lab result, we will notify you by phone as soon as possible.  Submit refill requests through [employer] or call your pharmacy and they will forward the refill request to us. Please allow 3 business days for your refill to be completed.          Additional Information About Your Visit        [employer] Information     [employer] lets you send messages to your doctor, view your test results, renew your prescriptions, schedule appointments and more. To sign up, go to [email or web address] . Click on \"Log in\" on the left side of the screen, which will take you to the Welcome page. Then click on \"Sign up Now\" on the right side " of the page.     You will be asked to enter the access code listed below, as well as some personal information. Please follow the directions to create your username and password.     Your access code is: [email or web address]  Expires: 1/3/2019  7:03 PM     Your access code will  in 90 days. If you need help or a new code, please call your [place] clinic or [phone].        Care EveryWhere ID     This is your Care EveryWhere ID. This could be used by other organizations to access your [place] medical records  [id number]        Your Vitals Were     Pulse Temperature Pulse Oximetry             75 98.7  F (37.1  C) (Oral) 95%          Blood Pressure from Last 3 Encounters:   10/05/18 134/72   17 126/60   17 138/67    Weight from Last 3 Encounters:   17 148 lb 12.8 oz (67.5 kg)   17 146 lb (66.2 kg)   02/15/17 147 lb (66.7 kg)              Today, you had the following     No orders found for display         Today's Medication Changes          These changes are accurate as of 10/5/18  7:03 PM.  If you have any questions, ask your nurse or doctor.               Start taking these medicines.        Dose/Directions    predniSONE 20 MG tablet   Commonly known as:  DELTASONE   Used for:  Exacerbation of intermittent asthma, unspecified asthma severity        Dose:  40 mg   Take 2 tablets (40 mg) by mouth daily for 5 days   Quantity:  10 tablet   Refills:  0         These medicines have changed or have updated prescriptions.        Dose/Directions    * VENTOLIN  (90 Base) MCG/ACT inhaler   This may have changed:  Another medication with the same name was added. Make sure you understand how and when to take each.   Used for:  Moderate persistent asthma without complication   Generic drug:  albuterol        INHALE 2 PUFFS INTO THE LUNGS EVERY 6 HOURS AS NEEDED FOR SHORTNESS OF BREATH, DIFFICULTY BREATHING OR WHEEZING.   Quantity:  18 g   Refills:  4       * albuterol (2.5 MG/3ML) 0.083% neb  solution   This may have changed:  You were already taking a medication with the same name, and this prescription was added. Make sure you understand how and when to take each.   Used for:  Exacerbation of intermittent asthma, unspecified asthma severity        Dose:  2.5 mg   Take 1 vial (2.5 mg) by nebulization once for 1 dose   Quantity:  3 mL   Refills:  0       * albuterol 108 (90 Base) MCG/ACT inhaler   Commonly known as:  PROAIR HFA/PROVENTIL HFA/VENTOLIN HFA   This may have changed:  You were already taking a medication with the same name, and this prescription was added. Make sure you understand how and when to take each.   Used for:  Exacerbation of intermittent asthma, unspecified asthma severity        Dose:  2 puff   Inhale 2 puffs into the lungs every 4 hours as needed for shortness of breath / dyspnea or wheezing   Quantity:  1 Inhaler   Refills:  1       * Notice:  This list has 3 medication(s) that are the same as other medications prescribed for you. Read the directions carefully, and ask your doctor or other care provider to review them with you.         Where to get your medicines      These medications were sent to [facility] [id number] [place] [address] [address] AT [address] [place] [address] & [address] [facility] [address]    Hours:  24-hours Phone:  [phone]     albuterol 108 (90 Base) MCG/ACT inhaler    predniSONE 20 MG tablet         Some of these will need a paper prescription and others can be bought over the counter.  Ask your nurse if you have questions.     You don't need a prescription for these medications     albuterol (2.5 MG/3ML) 0.083% neb solution                Primary Care Provider Office Phone # Fax #    Malissa Lee PA-C [phone] [address] [facility] [address]  [facility] [address]        Equal Access to Services     TAWANA DIANE AH: Rain Galeana, teo patterson, qamickie jasmine, jean-pierre graham  lajustin pierre. So [facility] [phone].    ATENCIÓN: Si habla charlie, tiene a sexton disposición servicios gratuitos de asistencia lingüística. Alena franco [phone].    We comply with applicable federal civil rights laws and [place] laws. We do not discriminate on the basis of race, color, national origin, age, disability, sex, sexual orientation, or gender identity.            Thank you!     Thank you for choosing [facility]  for your care. Our goal is always to provide you with excellent care. Hearing back from our patients is one way we can continue to improve our services. Please take a few minutes to complete the written survey that you may receive in the mail after your visit with us. Thank you!             Your Updated Medication List - Protect others around you: Learn how to safely use, store and throw away your medicines at www.disposemymeds.org.          This list is accurate as of 10/5/18  7:03 PM.  Always use your most recent med list.                   Brand Name Dispense Instructions for use Diagnosis    atenolol 50 MG tablet    TENORMIN    30 tablet    TAKE ONE TABLET BY MOUTH EVERY DAY    HTN (hypertension), benign       FLOVENT  MCG/ACT Inhaler   Generic drug:  fluticasone     3 Inhaler    INHALE TWO PUFFS INTO THE LUNGS TWO TIMES A DAY    Mild persistent asthma without complication       hydrochlorothiazide 25 MG tablet    HYDRODIURIL    45 tablet    TAKE ONE-HALF TABLET BY MOUTH EVERY DAY. PLEASE SCHEDULE OFFICE VISIT TO DISCUSS FURTHER REFILLS [phone].    HTN (hypertension), benign       levothyroxine 50 MCG tablet    SYNTHROID/LEVOTHROID    90 tablet    TAKE ONE TABLET BY MOUTH EVERY DAY    Hypothyroidism, unspecified type       predniSONE 20 MG tablet    DELTASONE    10 tablet    Take 2 tablets (40 mg) by mouth daily for 5 days    Exacerbation of intermittent asthma, unspecified asthma severity       * VENTOLIN  (90 Base) MCG/ACT inhaler   Generic drug:   albuterol     18 g    INHALE 2 PUFFS INTO THE LUNGS EVERY 6 HOURS AS NEEDED FOR SHORTNESS OF BREATH, DIFFICULTY BREATHING OR WHEEZING.    Moderate persistent asthma without complication       * albuterol (2.5 MG/3ML) 0.083% neb solution     3 mL    Take 1 vial (2.5 mg) by nebulization once for 1 dose    Exacerbation of intermittent asthma, unspecified asthma severity       * albuterol 108 (90 Base) MCG/ACT inhaler    PROAIR HFA/PROVENTIL HFA/VENTOLIN HFA    1 Inhaler    Inhale 2 puffs into the lungs every 4 hours as needed for shortness of breath / dyspnea or wheezing    Exacerbation of intermittent asthma, unspecified asthma severity       * Notice:  This list has 3 medication(s) that are the same as other medications prescribed for you. Read the directions carefully, and ask your doctor or other care provider to review them with you.

## 2018-10-06 NOTE — PATIENT INSTRUCTIONS
Caring for Your Inhaler  Your healthcare provider may prescribe medicine that you breathe in using a metered-dose inhaler. It is important to keep it clean. You should also keep track of how much medicine is left in the canister so you ll never run out.    Keeping your inhaler clean    Take off the canister, the part with the medicine, and cap from the mouthpiece.    Don't wash the canister or put it in water.    Run warm water through the mouthpiece for about a minute.    Shake off the water and let it air-dry.    If you need to use it before it is dry, shake off any water and replace the canister. Test spray it away from you to make sure it works.    If you use a spacer, clean it with warm water and a small amount of mild dish soap. Do this once every week or two.    Make sure you check the package insert for special instructions. The insert is the information that comes with the medicine. It may tell you how to take care of and clean your spacer.  When to replace your inhaler  Each inhaler is good for only a certain number of puffs of medicine. After those puffs are used up, any puffs left will not give you the amount of medicine you need. To be sure you ll get enough medicine when you need it, keep track of how many puffs you use. Here s an easy way to keep track of the medicine in your inhaler:  1. Find the number on the mouthpiece that tells you how many puffs it contains. Some inhalers have the counter on the mouthpiece instead of the canister. Keep the canister and mouthpiece together so you can keep track of how many puffs are left.  2. Divide this number by how many puffs you are told to use in one day. This gives you the number of days your medicine should last.  3. Use your calendar to find out what date your medicine will run out. Michael it on the canister and on your calendar.  Be sure to get a refill of your medicines before you run out. Some inhalers have dose counters to track the amount of medicine  used.  For example, if your new canister holds 200 puffs and you ve been told to use 4 puffs a day:  200 ÷ 4 = 50 days  Sample for you to fill in:     ____________  Number of puffs in new canister ÷    ____________  Number of puffs you use each day =    ____________  Number of days medicine will last   Note: Remember that your medicine will not last long if you use your inhaler more often than planned.   Date Last Reviewed: 10/1/2016    1232-6570 [employer]. [address] [place] [address]. All rights reserved. This information is not intended as a substitute for professional medical care. Always follow your healthcare professional's instructions.        Inhaler Use  The inhaler that you were prescribed contains a potent medicine. It should only be used as directed. The medicine in your inhaler must be breathed deeply into your lungs for it to work. It will not work at all if it only reaches your mouth and throat. Follow the instructions below for best results. And remember to follow your asthma action plan as given to you by your doctor.  1. Keep your inhaler at room temperature.  2. Hold the inhaler so that the part that goes into your mouth is at the bottom.  3. Shake the inhaler well and remove the cap.  4. Breathe out through your mouth to fully empty your lungs.  5. Place the inhaler in your mouth and close your lips tightly around it. (Or hold the inhaler 1 to 2 inches from your open mouth if told to do so by your healthcare provider.)  6. Squeeze the inhaler as you breathe in slowly through your mouth until your lungs are full of air, drawing the medicine deep into your lungs.  7. Hold your breath for 10 seconds, or as long as you can comfortably hold your breath. Then breathe out slowly.  8. If you have been advised to take 2 puffs, wait 5 minutes, then repeat steps 3 to 7 above. Waiting 5 minutes between puffs will allow the medicine to open up your lungs so the second puff can get  deeper into the lungs. Replace the cap when done.  9. If you were prescribed both a steroid inhaler and a bronchodilator inhaler, use the bronchodilator first to open the air passages. Wait 5 minutes, then use the steroid inhaler.  10. Rinse your mouth with water and spit it out (especially after using a steroid inhaler). This is very important if you are using a steroid inhaler to prevent thrush, a mild yeast infection of the mouth and back of the throat.  11. A special chamber ( spacer ) may be prescribed that attaches to your inhaler. This increases the amount of medicine that goes to your lungs. It also improves how well each treatment works. Ask your doctor about this if you did not receive one.    Keep it clean  Remove the metal canister and do not immerse it in water. Then clean the plastic mouthpiece, cap, and spacer if you have one, by rinsing them well in warm running water for 30 to 60 seconds. Shake off excess water and allow the mouthpiece to dry completely (overnight is recommended). This should be done once a week. If you need the inhaler before the mouthpiece is dry, shake off excess water, replace the canister, and test spray 2 times (away from the face).  Warning  A steroid inhaler is used to prevent an asthma attack. Do not use this to treat an acute wheezing episode. Use only bronchodilator inhalers (quick relief) to treat an acute asthma attack.  If you find that your medicine is not working and you need to use it more often than prescribed, this could be a sign that your asthma is getting worse. Go to the emergency room or urgent care right away. An asthma attack is easiest to treat in the early stages before it becomes severe.  When to seek medical advice  Get prompt medical attention if any of the following occur:    Increased wheezing or shortness of breath    Need to use your inhalers more often than usual without relief    Fever of 100.4 F (38 C) or higher, or as directed by your  healthcare provider    Coughing up lots of dark-colored or bloody sputum (mucus)    Chest pain with each breath    Blue lips or fingernails    Peak flow reading less than 50% of your normal best  Date Last Reviewed: 5/1/2017 2000-2017 [employer]. [address] [place] [address]. All rights reserved. This information is not intended as a substitute for professional medical care. Always follow your healthcare professional's instructions.        Discharge Instructions for Asthma  You have been diagnosed with an asthma attack. With the help of your healthcare provider, you can keep your asthma under control and have less emergency department visits and stays in the hospital.    Managing asthma    Take your asthma medicines exactly as your provider tells you. Do this even if you feel that your athma is under control.    Learn how to monitor your asthma. Some people watch for early changes of symptoms getting worse. Some use a peak flow meter. Your healthcare provider may decide to give you an asthma action plan.    Be sure to always have a quick-relief inhaler with you. If you were given a prescription, make sure you go to a pharmacy to get it filled as soon as possible.  Controlling asthma triggers  Triggers are those things that make your asthma symptoms worse or cause asthma attacks. Many people with asthma have allergies that can be triggers. Your healthcare provider may have you get allergy testing to find out what you are allergic to. This can help you stay away from triggers.  Dust or dust mites are a common asthma trigger. To avoid a dust mites, do the following:    Use dust-proof covers on your mattress and pillows. Wash the sheets and blankets on your bed once a week in very hot water.    Don t sleep or lie on cloth-covered cushions or furniture.    Ask someone else to vacuum and dust your house.    If you do vacuum and dust yourself, wear a dust mask. You can buy them from the  hardware store.    Use a vacuum with a double-layered bag or HEPA (high-efficiency particulate air) filter.  Pets with fur or feathers are triggers for some people. If you must have pets, take these precautions:    Keep pets out of your bedroom and off your bed. Keep the bedroom door closed.    Cover the air vents in your bedroom with heavy material to filter the air.    Don't use carpets or cloth-covered furniture in your home. If this is not possible, keep pets out of rooms with these items.    Have someone bathe your pets every week. And brush them often.  If you smoke, do your best to quit.    Enroll in a stop-smoking program to increase your chance of success.    Ask your healthcare provider about medicines or other methods to help you quit.    Ask family members to quit smoking as well.    Don t allow anyone to smoke in your home, in your car, or around you.  Other steps to take    Make sure you know what to do if exercise is a trigger for you. Many people use quick-relief inhalers before exercise or physical activity.    Get a flu shot every year and get pneumonia shots as advised by your healthcare provider.    Try to keep your windows closed during pollen seasons and when mold counts are high.    On cold or windy days, cover your nose and mouth with a scarf.    Try to stay away from people who are sick with colds or the flu. Wash your hands often or use a hand . If respiratory infections like colds or flu trigger your asthma, use your quick-relief medicines as soon as you begin to notice respiratory symptoms. They may include a runny or stuffy nose, sore throat, or a cough.  Follow-up care  Make a follow-up appointment as directed. Follow your asthma action plan if you were given one.  Call 911  Call 911 right away if you have:    Severe wheezing    Shortness of breath that is not relieved by your quick-relief medicine    Trouble walking or talking because of shortness of breath    Blue lips or  fingernails    If you monitor symptoms with a peak flow meter, readings less than 50% of your personal best   Date Last Reviewed: 2/3/2017    5897-6487 [employer]. [address] [place] [address], [place] [address]. All rights reserved. This information is not intended as a substitute for professional medical care. Always follow your healthcare professional's instructions.

## 2018-10-09 ENCOUNTER — OFFICE VISIT (OUTPATIENT)
Dept: FAMILY MEDICINE | Facility: CLINIC | Age: 83
End: 2018-10-09
Payer: COMMERCIAL

## 2018-10-09 VITALS
DIASTOLIC BLOOD PRESSURE: 65 MMHG | BODY MASS INDEX: 22.84 KG/M2 | WEIGHT: 124.1 LBS | HEART RATE: 55 BPM | HEIGHT: 62 IN | SYSTOLIC BLOOD PRESSURE: 128 MMHG | OXYGEN SATURATION: 96 % | TEMPERATURE: 97.4 F

## 2018-10-09 DIAGNOSIS — I10 HTN (HYPERTENSION), BENIGN: ICD-10-CM

## 2018-10-09 DIAGNOSIS — E03.9 HYPOTHYROIDISM, UNSPECIFIED TYPE: ICD-10-CM

## 2018-10-09 DIAGNOSIS — J45.41 MODERATE PERSISTENT ASTHMA WITH EXACERBATION: Primary | ICD-10-CM

## 2018-10-09 PROCEDURE — 80048 BASIC METABOLIC PNL TOTAL CA: CPT | Performed by: PHYSICIAN ASSISTANT

## 2018-10-09 PROCEDURE — 36415 COLL VENOUS BLD VENIPUNCTURE: CPT | Performed by: PHYSICIAN ASSISTANT

## 2018-10-09 PROCEDURE — 84443 ASSAY THYROID STIM HORMONE: CPT | Performed by: PHYSICIAN ASSISTANT

## 2018-10-09 PROCEDURE — 99214 OFFICE O/P EST MOD 30 MIN: CPT | Performed by: PHYSICIAN ASSISTANT

## 2018-10-09 RX ORDER — HYDROCHLOROTHIAZIDE 12.5 MG/1
12.5 TABLET ORAL DAILY
Qty: 90 TABLET | Refills: 3 | Status: SHIPPED | OUTPATIENT
Start: 2018-10-09 | End: 2019-12-18

## 2018-10-09 RX ORDER — ATENOLOL 50 MG/1
50 TABLET ORAL DAILY
Qty: 90 TABLET | Refills: 3 | Status: SHIPPED | OUTPATIENT
Start: 2018-10-09 | End: 2019-12-18

## 2018-10-09 RX ORDER — FLUTICASONE PROPIONATE 110 UG/1
AEROSOL, METERED RESPIRATORY (INHALATION)
Qty: 3 INHALER | Refills: 3 | Status: SHIPPED | OUTPATIENT
Start: 2018-10-09 | End: 2019-05-06

## 2018-10-09 NOTE — MR AVS SNAPSHOT
"              After Visit Summary   10/9/2018    Isaura Weathers    MRN: [id number]           Patient Information     Date Of Birth          1935        Visit Information        Provider Department      10/9/2018 11:30 AM Malissa Lee PA-C [facility]        Today's Diagnoses     Moderate persistent asthma with exacerbation    -  1    HTN (hypertension), benign        Hypothyroidism, unspecified type           Follow-ups after your visit        Follow-up notes from your care team     Return in about 1 year (around 10/9/2019) for BP Recheck.      Who to contact     If you have questions or need follow up information about today's clinic visit or your schedule please contact [facility] directly at [phone].  Normal or non-critical lab and imaging results will be communicated to you by PulseOnhart, letter or phone within 4 business days after the clinic has received the results. If you do not hear from us within 7 days, please contact the clinic through PulseOnhart or phone. If you have a critical or abnormal lab result, we will notify you by phone as soon as possible.  Submit refill requests through [employer] or call your pharmacy and they will forward the refill request to us. Please allow 3 business days for your refill to be completed.          Additional Information About Your Visit        PulseOnhart Information     [employer] lets you send messages to your doctor, view your test results, renew your prescriptions, schedule appointments and more. To sign up, go to [email or web address] . Click on \"Log in\" on the left side of the screen, which will take you to the Welcome page. Then click on \"Sign up Now\" on the right side of the page.     You will be asked to enter the access code listed below, as well as some personal information. Please follow the directions to create your username and password.     Your access code is: [email or web address]  Expires: 1/3/2019  7:03 PM     Your access code will " " in 90 days. If you need help or a new code, please call your [facility] clinic or [phone].        Care EveryWhere ID     This is your Care EveryWhere ID. This could be used by other organizations to access your [facility] medical records  [id number]        Your Vitals Were     Pulse Temperature Height Pulse Oximetry Breastfeeding? BMI (Body Mass Index)    55 97.4  F (36.3  C) (Oral) 5' 2\" (1.575 m) 96% No 22.7 kg/m2       Blood Pressure from Last 3 Encounters:   10/09/18 128/65   10/05/18 134/72   17 126/60    Weight from Last 3 Encounters:   10/09/18 124 lb 1.6 oz (56.3 kg)   17 148 lb 12.8 oz (67.5 kg)   17 146 lb (66.2 kg)              We Performed the Following     Basic metabolic panel     TSH with free T4 reflex          Today's Medication Changes          These changes are accurate as of 10/9/18 11:51 AM.  If you have any questions, ask your nurse or doctor.               These medicines have changed or have updated prescriptions.        Dose/Directions    * albuterol (2.5 MG/3ML) 0.083% neb solution   This may have changed:  Another medication with the same name was removed. Continue taking this medication, and follow the directions you see here.   Used for:  Exacerbation of intermittent asthma, unspecified asthma severity   Changed by:  Malissa Lee PA-C        Dose:  2.5 mg   Take 1 vial (2.5 mg) by nebulization once for 1 dose   Quantity:  3 mL   Refills:  0       * albuterol 108 (90 Base) MCG/ACT inhaler   Commonly known as:  PROAIR HFA/PROVENTIL HFA/VENTOLIN HFA   This may have changed:  Another medication with the same name was removed. Continue taking this medication, and follow the directions you see here.   Used for:  Exacerbation of intermittent asthma, unspecified asthma severity   Changed by:  Malissa Lee PA-C        Dose:  2 puff   Inhale 2 puffs into the lungs every 4 hours as needed for shortness of breath / dyspnea or wheezing   Quantity:  1 Inhaler "   Refills:  1       atenolol 50 MG tablet   Commonly known as:  TENORMIN   This may have changed:  See the new instructions.   Used for:  HTN (hypertension), benign   Changed by:  Malissa Lee PA-C        Dose:  50 mg   Take 1 tablet (50 mg) by mouth daily   Quantity:  90 tablet   Refills:  3       hydrochlorothiazide 12.5 MG Tabs tablet   This may have changed:    - medication strength  - See the new instructions.   Used for:  HTN (hypertension), benign   Changed by:  Malissa Lee PA-C        Dose:  12.5 mg   Take 1 tablet (12.5 mg) by mouth daily   Quantity:  90 tablet   Refills:  3       * Notice:  This list has 2 medication(s) that are the same as other medications prescribed for you. Read the directions carefully, and ask your doctor or other care provider to review them with you.         Where to get your medicines      These medications were sent to [facility], [place] - [address]  [address], [facility] [address]     Phone:  [phone]     atenolol 50 MG tablet    fluticasone 110 MCG/ACT Inhaler    hydrochlorothiazide 12.5 MG Tabs tablet                Primary Care Provider Office Phone # Fax #    Malissa Lee PA-C [phone] [address]  [facility] [address]        Equal Access to Services     [facility] AH: Hadii aad ku hadasho Soomaali, waaxda luqadaha, qaybta kaalmada adeegyada, jean-pierre araujo hayclaudia martins . So [facility] [phone].    ATENCIÓN: Si habla español, tiene a sexton disposición servicios gratuitos de asistencia lingüística. Llame al [phone].    We comply with applicable federal civil rights laws and [place] laws. We do not discriminate on the basis of race, color, national origin, age, disability, sex, sexual orientation, or gender identity.            Thank you!     Thank you for choosing [facility]  for your care. Our goal is always to provide you with excellent  care. Hearing back from our patients is one way we can continue to improve our services. Please take a few minutes to complete the written survey that you may receive in the mail after your visit with us. Thank you!             Your Updated Medication List - Protect others around you: Learn how to safely use, store and throw away your medicines at www.disposemymeds.org.          This list is accurate as of 10/9/18 11:51 AM.  Always use your most recent med list.                   Brand Name Dispense Instructions for use Diagnosis    * albuterol (2.5 MG/3ML) 0.083% neb solution     3 mL    Take 1 vial (2.5 mg) by nebulization once for 1 dose    Exacerbation of intermittent asthma, unspecified asthma severity       * albuterol 108 (90 Base) MCG/ACT inhaler    PROAIR HFA/PROVENTIL HFA/VENTOLIN HFA    1 Inhaler    Inhale 2 puffs into the lungs every 4 hours as needed for shortness of breath / dyspnea or wheezing    Exacerbation of intermittent asthma, unspecified asthma severity       atenolol 50 MG tablet    TENORMIN    90 tablet    Take 1 tablet (50 mg) by mouth daily    HTN (hypertension), benign       fluticasone 110 MCG/ACT Inhaler    FLOVENT HFA    3 Inhaler    INHALE TWO PUFFS INTO THE LUNGS TWO TIMES A DAY    Moderate persistent asthma with exacerbation       hydrochlorothiazide 12.5 MG Tabs tablet     90 tablet    Take 1 tablet (12.5 mg) by mouth daily    HTN (hypertension), benign       levothyroxine 50 MCG tablet    SYNTHROID/LEVOTHROID    90 tablet    TAKE ONE TABLET BY MOUTH EVERY DAY    Hypothyroidism, unspecified type       predniSONE 20 MG tablet    DELTASONE    10 tablet    Take 2 tablets (40 mg) by mouth daily for 5 days    Exacerbation of intermittent asthma, unspecified asthma severity       * Notice:  This list has 2 medication(s) that are the same as other medications prescribed for you. Read the directions carefully, and ask your doctor or other care provider to review them with you.

## 2018-10-09 NOTE — LETTER
[facility]  [address] [facility]  [address]  [place]  [address]  Tel: [phone]    October 11, 2018    Isaura Weathers  [address]  [facility] [address]        Dear Ms. Weathers,    Your thyroid blood test was in normal range.   Please continue your current dose of synthroid.  Your blood sugar, kidney function and electrolytes were also normal      Sincerely,    Malissa Lee PA-C/SHANNON          Enclosure: Lab Results  Results for orders placed or performed in visit on 10/09/18   Basic metabolic panel   Result Value Ref Range    Sodium 135 133 - 144 mmol/L    Potassium 3.9 3.4 - 5.3 mmol/L    Chloride 99 94 - 109 mmol/L    Carbon Dioxide 30 20 - 32 mmol/L    Anion Gap 6 3 - 14 mmol/L    Glucose 81 70 - 99 mg/dL    Urea Nitrogen 13 7 - 30 mg/dL    Creatinine 0.67 0.52 - 1.04 mg/dL    GFR Estimate 84 >60 mL/min/1.7m2    GFR Estimate If Black >90 >60 mL/min/1.7m2    Calcium 9.0 8.5 - 10.1 mg/dL   TSH with free T4 reflex   Result Value Ref Range    TSH 0.92 0.40 - 4.00 mU/L

## 2018-10-09 NOTE — PROGRESS NOTES
HPI:  Isaura is a pleasant 82 yo female her for f/u asthma, HTN and hypothyroidism  pt moved to a new [facility] so has been busy unpacking  She was in in urgent care on 10/5/18 and was prescribed prednisone for asthma exacerbation which has helped with her wheezing.  She doesn't use flovent regularly and now concerned that she will not be able to afford this as she has hit the donut hole for the rest of the year.  HTN: BP has been well controlled on atenolol and hydrochlorothiazide  Thyroid: no sxs of hypo or hyperthyroidism  Declines immunizations.    Past Medical History:   Diagnosis Date     Ankle fracture      HTN (hypertension), benign      Hyperlipidemia LDL goal < 130 1/27/2014     Hypothyroid      Moderate persistent asthma      Pneumonia      Rib fracture     from coughing     SBO (small bowel obstruction) (H) 2/2016    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     Past Surgical History:   Procedure Laterality Date     LAPAROSCOPY DIAGNOSTIC (GENERAL) N/A 2/22/2016    Procedure: LAPAROSCOPY DIAGNOSTIC (GENERAL);  Surgeon: Phill Hansen MD;  Location: [facility] OR     LAPAROTOMY, LYSIS ADHESIONS, COMBINED N/A 2/22/2016    Procedure: COMBINED LAPAROTOMY, LYSIS ADHESIONS;  Surgeon: Phill Hansen MD;  Location:  OR     RESECT SMALL BOWEL WITHOUT OSTOMY N/A 2/22/2016    Procedure: RESECT SMALL BOWEL WITHOUT OSTOMY;  Surgeon: Phill Hansen MD;  Location:  OR     Social History   Substance Use Topics     Smoking status: Never Smoker     Smokeless tobacco: Never Used     Alcohol use 0.0 - 0.6 oz/week     0 - 1 Standard drinks or equivalent per week      Comment: one/d     Current Outpatient Prescriptions   Medication Sig Dispense Refill     albuterol (PROAIR HFA/PROVENTIL HFA/VENTOLIN HFA) 108 (90 Base) MCG/ACT inhaler Inhale 2 puffs into the lungs every 4 hours as needed for shortness of breath / dyspnea or wheezing 1 Inhaler 1     atenolol (TENORMIN) 50 MG tablet Take 1 tablet (50 mg) by  "mouth daily 90 tablet 3     fluticasone (FLOVENT HFA) 110 MCG/ACT Inhaler INHALE TWO PUFFS INTO THE LUNGS TWO TIMES A DAY 3 Inhaler 3     hydrochlorothiazide 12.5 MG TABS tablet Take 1 tablet (12.5 mg) by mouth daily 90 tablet 3     levothyroxine (SYNTHROID/LEVOTHROID) 50 MCG tablet TAKE ONE TABLET BY MOUTH EVERY DAY 90 tablet 0     predniSONE (DELTASONE) 20 MG tablet Take 2 tablets (40 mg) by mouth daily for 5 days 10 tablet 0     [DISCONTINUED] atenolol (TENORMIN) 50 MG tablet TAKE ONE TABLET BY MOUTH EVERY DAY 30 tablet 0     [DISCONTINUED] FLOVENT  MCG/ACT Inhaler INHALE TWO PUFFS INTO THE LUNGS TWO TIMES A DAY 3 Inhaler 1     [DISCONTINUED] hydrochlorothiazide (HYDRODIURIL) 25 MG tablet TAKE ONE-HALF TABLET BY MOUTH EVERY DAY. PLEASE SCHEDULE OFFICE VISIT TO DISCUSS FURTHER REFILLS [phone]. 45 tablet 0     [DISCONTINUED] VENTOLIN  (90 BASE) MCG/ACT Inhaler INHALE 2 PUFFS INTO THE LUNGS EVERY 6 HOURS AS NEEDED FOR SHORTNESS OF BREATH, DIFFICULTY BREATHING OR WHEEZING. 18 g 4     Allergies   Allergen Reactions     Benicar [Olmesartan Medoxomil]      Robitussin Cough-Allergy      Pt does not recall a problem      FAMILY HISTORY NOTED AND REVIEWED  PHYSICAL EXAM:    /65  Pulse 55  Temp 97.4  F (36.3  C) (Oral)  Ht 5' 2\" (1.575 m)  Wt 124 lb 1.6 oz (56.3 kg)  SpO2 96%  Breastfeeding? No  BMI 22.7 kg/m2    Patient appears non toxic  Lung: faint expiratory wheezes throughout  Heart: RRR  Extr: no edema.  Ears: bilat hearing aids    Assessment and Plan:     (J45.41) Moderate persistent asthma with exacerbation  (primary encounter diagnosis)  Comment: I gave pt the number for the [facility] Rx assistance program as she cannot afford her inhalers.  Plan: fluticasone (FLOVENT HFA) 110 MCG/ACT Inhaler        refilled    (I10) HTN (hypertension), benign  Comment: well controlled, cont same. She is breaking hydrochlorothiazide 25mg pills in half so changed these to the 12.5mg pills so doesn't need to " cut them  Plan: hydrochlorothiazide 12.5 MG TABS tablet, Basic         metabolic panel, atenolol (TENORMIN) 50 MG         tablet            (E03.9) Hypothyroidism, unspecified type  Comment:   Plan: TSH with free T4 reflex              Malissa Lee PA-C

## 2018-10-10 LAB
ANION GAP SERPL CALCULATED.3IONS-SCNC: 6 MMOL/L (ref 3–14)
BUN SERPL-MCNC: 13 MG/DL (ref 7–30)
CALCIUM SERPL-MCNC: 9 MG/DL (ref 8.5–10.1)
CHLORIDE SERPL-SCNC: 99 MMOL/L (ref 94–109)
CO2 SERPL-SCNC: 30 MMOL/L (ref 20–32)
CREAT SERPL-MCNC: 0.67 MG/DL (ref 0.52–1.04)
GFR SERPL CREATININE-BSD FRML MDRD: 84 ML/MIN/1.7M2
GLUCOSE SERPL-MCNC: 81 MG/DL (ref 70–99)
POTASSIUM SERPL-SCNC: 3.9 MMOL/L (ref 3.4–5.3)
SODIUM SERPL-SCNC: 135 MMOL/L (ref 133–144)
TSH SERPL DL<=0.005 MIU/L-ACNC: 0.92 MU/L (ref 0.4–4)

## 2018-10-10 ASSESSMENT — ASTHMA QUESTIONNAIRES: ACT_TOTALSCORE: 21

## 2018-10-11 NOTE — PROGRESS NOTES
Isaura,    Your thyroid blood test was in normal range.   Please continue your current dose of synthroid.  Your blood sugar, kidney function and electrolytes were also normal.    Malissa Lee PA-C

## 2018-11-14 DIAGNOSIS — E03.9 HYPOTHYROIDISM, UNSPECIFIED TYPE: ICD-10-CM

## 2018-11-14 RX ORDER — LEVOTHYROXINE SODIUM 50 UG/1
TABLET ORAL
Qty: 90 TABLET | Refills: 2 | Status: SHIPPED | OUTPATIENT
Start: 2018-11-14 | End: 2019-08-02

## 2018-11-14 NOTE — TELEPHONE ENCOUNTER
"levothyroxine (SYNTHROID/LEVOTHROID) 50 MCG tablet 90 tablet 0 8/10/2018     Last Written Prescription Date:  8/10/18  Last Fill Quantity: 90,  # refills: 0   Last office visit: 10/9/2018 with prescribing provider:  Rosa   Future Office Visit:  none  Requested Prescriptions   Pending Prescriptions Disp Refills     levothyroxine (SYNTHROID/LEVOTHROID) 50 MCG tablet [Pharmacy Med Name: LEVOTHYROXINE SODIUM 50MCG TABS] 90 tablet 0     Sig: TAKE ONE TABLET BY MOUTH EVERY DAY. DUE FOR APPOINTMENT AND LABS    Thyroid Protocol Passed    11/14/2018  8:54 AM       Passed - Patient is 12 years or older       Passed - Recent (12 mo) or future (30 days) visit within the authorizing provider's specialty    Patient had office visit in the last 12 months or has a visit in the next 30 days with authorizing provider or within the authorizing provider's specialty.  See \"Patient Info\" tab in inbasket, or \"Choose Columns\" in Meds & Orders section of the refill encounter.             Passed - Normal TSH on file in past 12 months    Recent Labs   Lab Test  10/09/18   1201   TSH  0.92             Passed - No active pregnancy on record    If patient is pregnant or has had a positive pregnancy test, please check TSH.         Passed - No positive pregnancy test in past 12 months    If patient is pregnant or has had a positive pregnancy test, please check TSH.          No flowsheet data found.  "

## 2018-11-14 NOTE — TELEPHONE ENCOUNTER
Prescription approved per [facility] Refill Protocol.    Diana Gamboa, BS, RN, PHN

## 2019-05-06 ENCOUNTER — OFFICE VISIT (OUTPATIENT)
Dept: FAMILY MEDICINE | Facility: CLINIC | Age: 84
End: 2019-05-06
Payer: COMMERCIAL

## 2019-05-06 VITALS
WEIGHT: 140 LBS | BODY MASS INDEX: 25.76 KG/M2 | HEART RATE: 72 BPM | HEIGHT: 62 IN | DIASTOLIC BLOOD PRESSURE: 60 MMHG | SYSTOLIC BLOOD PRESSURE: 138 MMHG | OXYGEN SATURATION: 94 % | TEMPERATURE: 97.3 F

## 2019-05-06 DIAGNOSIS — I10 HTN (HYPERTENSION), BENIGN: ICD-10-CM

## 2019-05-06 DIAGNOSIS — J45.41 MODERATE PERSISTENT ASTHMA WITH EXACERBATION: Primary | ICD-10-CM

## 2019-05-06 DIAGNOSIS — Z91.148 NONCOMPLIANCE WITH MEDICATION REGIMEN: ICD-10-CM

## 2019-05-06 PROCEDURE — 99214 OFFICE O/P EST MOD 30 MIN: CPT | Performed by: PHYSICIAN ASSISTANT

## 2019-05-06 RX ORDER — FLUTICASONE PROPIONATE 110 UG/1
AEROSOL, METERED RESPIRATORY (INHALATION)
Qty: 3 INHALER | Refills: 3 | Status: SHIPPED | OUTPATIENT
Start: 2019-05-06 | End: 2019-10-30

## 2019-05-06 RX ORDER — ALBUTEROL SULFATE 90 UG/1
2 AEROSOL, METERED RESPIRATORY (INHALATION) EVERY 4 HOURS PRN
Qty: 18 G | Refills: 3 | Status: SHIPPED | OUTPATIENT
Start: 2019-05-06 | End: 2019-12-18

## 2019-05-06 ASSESSMENT — MIFFLIN-ST. JEOR: SCORE: 1048.29

## 2019-05-06 NOTE — PROGRESS NOTES
HPI: Isaura is an 84 yo female here for f/u asthma  She hasn't been using her flovent regularly as she worries about running out of medication  She has a rescue inhaler that she has needed to use more often lately  She notes her asthma does tend to be worse in the spring.  Denies fever or chills.     Past Medical History:   Diagnosis Date     Ankle fracture      HTN (hypertension), benign      Hyperlipidemia LDL goal < 130 1/27/2014     Hypothyroid      Moderate persistent asthma      Pneumonia      Rib fracture     from coughing     SBO (small bowel obstruction) (H) 2/2016    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     Past Surgical History:   Procedure Laterality Date     LAPAROSCOPY DIAGNOSTIC (GENERAL) N/A 2/22/2016    Procedure: LAPAROSCOPY DIAGNOSTIC (GENERAL);  Surgeon: Phill Hansen MD;  Location: [facility] OR     LAPAROTOMY, LYSIS ADHESIONS, COMBINED N/A 2/22/2016    Procedure: COMBINED LAPAROTOMY, LYSIS ADHESIONS;  Surgeon: Phill Hansen MD;  Location:  OR     RESECT SMALL BOWEL WITHOUT OSTOMY N/A 2/22/2016    Procedure: RESECT SMALL BOWEL WITHOUT OSTOMY;  Surgeon: Phill Hansen MD;  Location: [facility] OR     Social History     Tobacco Use     Smoking status: Never Smoker     Smokeless tobacco: Never Used   Substance Use Topics     Alcohol use: Yes     Alcohol/week: 0.0 - 0.6 oz     Comment: one/d     Current Outpatient Medications   Medication Sig Dispense Refill     albuterol (PROAIR HFA/PROVENTIL HFA/VENTOLIN HFA) 108 (90 Base) MCG/ACT inhaler Inhale 2 puffs into the lungs every 4 hours as needed for shortness of breath / dyspnea or wheezing 18 g 3     atenolol (TENORMIN) 50 MG tablet Take 1 tablet (50 mg) by mouth daily 90 tablet 3     fluticasone (FLOVENT HFA) 110 MCG/ACT inhaler INHALE TWO PUFFS INTO THE LUNGS TWO TIMES A DAY 3 Inhaler 3     hydrochlorothiazide 12.5 MG TABS tablet Take 1 tablet (12.5 mg) by mouth daily 90 tablet 3     levothyroxine (SYNTHROID/LEVOTHROID) 50 MCG  "tablet TAKE ONE TABLET BY MOUTH EVERY DAY. DUE FOR APPOINTMENT AND LABS 90 tablet 2     albuterol (2.5 MG/3ML) 0.083% neb solution Take 1 vial (2.5 mg) by nebulization once for 1 dose 3 mL 0     Allergies   Allergen Reactions     Benicar [Olmesartan Medoxomil]      Robitussin Cough-Allergy      Pt does not recall a problem      FAMILY HISTORY NOTED AND REVIEWED    PHYSICAL EXAM:    /60   Pulse 72   Temp 97.3  F (36.3  C)   Ht 1.575 m (5' 2\")   Wt 63.5 kg (140 lb)   SpO2 94%   BMI 25.61 kg/m      Patient appears non toxic  Lungs: CTA bilat  Heart: RRR  Extr: no edema  Psych: anxious and somewhat confused    Assessment and Plan:     (J45.41) Moderate persistent asthma with exacerbation  (primary encounter diagnosis)  Comment: pt given very clear instructions to use her flovent 2 puffs BID and NOT PRN. Wrote these directions on her inhaler as well. Also on med list.  Plan: fluticasone (FLOVENT HFA) 110 MCG/ACT inhaler        refilled    (Z91.14) Noncompliance with medication regimen  Comment:   Plan: discussed    (I10) HTN (hypertension), benign  Comment:   Plan: high initially but down on recheck. No changes made today.      Malissa Lee PA-C          "

## 2019-05-07 ASSESSMENT — ASTHMA QUESTIONNAIRES: ACT_TOTALSCORE: 18

## 2019-05-09 ENCOUNTER — DOCUMENTATION ONLY (OUTPATIENT)
Dept: OTHER | Facility: CLINIC | Age: 84
End: 2019-05-09

## 2019-08-02 DIAGNOSIS — E03.9 HYPOTHYROIDISM, UNSPECIFIED TYPE: ICD-10-CM

## 2019-08-02 NOTE — TELEPHONE ENCOUNTER
"Last Written Prescription Date:  11/14/18  Last Fill Quantity: 90 tablet,  # refills: 2   Last office visit: 5/6/2019 with prescribing provider:  Rosa   Future Office Visit:      Requested Prescriptions   Pending Prescriptions Disp Refills     levothyroxine (SYNTHROID/LEVOTHROID) 50 MCG tablet [Pharmacy Med Name: LEVOTHYROXINE SODIUM 50MCG TABS] 90 tablet 0     Sig: TAKE ONE TABLET BY MOUTH EVERY DAY       Thyroid Protocol Passed - 8/2/2019  5:38 PM        Passed - Patient is 12 years or older        Passed - Recent (12 mo) or future (30 days) visit within the authorizing provider's specialty     Patient had office visit in the last 12 months or has a visit in the next 30 days with authorizing provider or within the authorizing provider's specialty.  See \"Patient Info\" tab in inbasket, or \"Choose Columns\" in Meds & Orders section of the refill encounter.              Passed - Medication is active on med list        Passed - Normal TSH on file in past 12 months     Recent Labs   Lab Test 10/09/18  1201   TSH 0.92              Passed - No active pregnancy on record     If patient is pregnant or has had a positive pregnancy test, please check TSH.          Passed - No positive pregnancy test in past 12 months     If patient is pregnant or has had a positive pregnancy test, please check TSH.            "

## 2019-08-05 RX ORDER — LEVOTHYROXINE SODIUM 50 UG/1
TABLET ORAL
Qty: 90 TABLET | Refills: 0 | Status: SHIPPED | OUTPATIENT
Start: 2019-08-05 | End: 2019-10-30

## 2019-08-05 NOTE — TELEPHONE ENCOUNTER
Prescription approved per [facility] Refill Protocol.  Kylah Gonzalez RN

## 2019-10-18 DIAGNOSIS — I10 HTN (HYPERTENSION), BENIGN: Primary | ICD-10-CM

## 2019-10-19 NOTE — TELEPHONE ENCOUNTER
"Requested Prescriptions   Pending Prescriptions Disp Refills     atenolol (TENORMIN) 50 MG tablet [Pharmacy Med Name: ATENOLOL 50MG TABS]  Last Written Prescription Date:  10/9/2018  Last Fill Quantity: 90 tabs,  # refills: 3   Last office visit: 5/6/2019 with prescribing provider:  Rosa   Future Office Visit:   90 tablet 1     Sig: TAKE ONE TABLET BY MOUTH EVERY DAY       Beta-Blockers Protocol Passed - 10/18/2019  3:52 PM        Passed - Blood pressure under 140/90 in past 12 months     BP Readings from Last 3 Encounters:   05/06/19 138/60   10/09/18 128/65   10/05/18 134/72                 Passed - Patient is age 6 or older        Passed - Recent (12 mo) or future (30 days) visit within the authorizing provider's specialty     Patient has had an office visit with the authorizing provider or a provider within the authorizing providers department within the previous 12 mos or has a future within next 30 days. See \"Patient Info\" tab in inbasket, or \"Choose Columns\" in Meds & Orders section of the refill encounter.              Passed - Medication is active on med list         "

## 2019-10-22 RX ORDER — ATENOLOL 50 MG/1
TABLET ORAL
Qty: 90 TABLET | Refills: 1 | Status: SHIPPED | OUTPATIENT
Start: 2019-10-22 | End: 2020-04-06

## 2019-10-30 DIAGNOSIS — E03.9 HYPOTHYROIDISM, UNSPECIFIED TYPE: ICD-10-CM

## 2019-10-30 DIAGNOSIS — J45.41 MODERATE PERSISTENT ASTHMA WITH EXACERBATION: ICD-10-CM

## 2019-10-30 DIAGNOSIS — I10 HTN (HYPERTENSION), BENIGN: Primary | ICD-10-CM

## 2019-10-31 RX ORDER — LEVOTHYROXINE SODIUM 50 UG/1
50 TABLET ORAL DAILY
Qty: 30 TABLET | Refills: 0 | Status: SHIPPED | OUTPATIENT
Start: 2019-10-31 | End: 2019-12-05

## 2019-10-31 RX ORDER — HYDROCHLOROTHIAZIDE 12.5 MG/1
1 CAPSULE ORAL DAILY
Qty: 30 CAPSULE | Refills: 0 | Status: SHIPPED | OUTPATIENT
Start: 2019-10-31 | End: 2019-12-05

## 2019-10-31 RX ORDER — FLUTICASONE PROPIONATE 110 UG/1
AEROSOL, METERED RESPIRATORY (INHALATION)
Qty: 36 G | Refills: 0 | Status: SHIPPED | OUTPATIENT
Start: 2019-10-31 | End: 2019-12-18

## 2019-10-31 NOTE — TELEPHONE ENCOUNTER
hydrochlorothiazide (MICROZIDE) 12.5 MG capsule  Last Written Prescription Date:  10/09/18  Last Fill Quantity: 90 tablet,  # refills: 3   Last office visit: 5/6/2019 with prescribing provider:  Rosa Chaudhari Office Visit:      levothyroxine (SYNTHROID/LEVOTHROID) 50 MCG tablet  Last Written Prescription Date:  8/05/19  Last Fill Quantity: 90 tablet,  # refills: 0   Last office visit: 5/6/2019 with prescribing provider:  Rosa Chaudhari Office Visit:      fluticasone (FLOVENT HFA) 110 MCG/ACT inhaler  Last Written Prescription Date:  5/06/19  Last Fill Quantity: 3 inhaler,  # refills: 3   Last office visit: 5/6/2019 with prescribing provider:  Rosa Chaudhari Office Visit:      ACT Total Scores 2/16/2018 10/9/2018 5/6/2019   ACT TOTAL SCORE - - -   ASTHMA ER VISITS - - -   ASTHMA HOSPITALIZATIONS - - -   ACT TOTAL SCORE (Goal Greater than or Equal to 20) 25 21 18   In the past 12 months, how many times did you visit the emergency room for your asthma without being admitted to the hospital? 0 0 0   In the past 12 months, how many times were you hospitalized overnight because of your asthma? 0 0 0       Requested Prescriptions   Pending Prescriptions Disp Refills     hydrochlorothiazide (MICROZIDE) 12.5 MG capsule [Pharmacy Med Name: HYDROCHLOROTHIAZIDE 12.5MG CAPS] 90 capsule 1     Sig: TAKE ONE CAPSULE BY MOUTH EVERY DAY       Diuretics (Including Combos) Protocol Failed - 10/30/2019 11:09 AM        Failed - Normal serum creatinine on file in past 12 months     Recent Labs   Lab Test 10/09/18  1201   CR 0.67              Failed - Normal serum potassium on file in past 12 months     Recent Labs   Lab Test 10/09/18  1201   POTASSIUM 3.9                    Failed - Normal serum sodium on file in past 12 months     Recent Labs   Lab Test 10/09/18  1201                 Passed - Blood pressure under 140/90 in past 12 months     BP Readings from Last 3 Encounters:   05/06/19 138/60   10/09/18 128/65   10/05/18 134/72  "                Passed - Recent (12 mo) or future (30 days) visit within the authorizing provider's specialty     Patient has had an office visit with the authorizing provider or a provider within the authorizing providers department within the previous 12 mos or has a future within next 30 days. See \"Patient Info\" tab in inbasket, or \"Choose Columns\" in Meds & Orders section of the refill encounter.              Passed - Medication is active on med list        Passed - Patient is age 18 or older        Passed - No active pregancy on record        Passed - No positive pregnancy test in past 12 months        levothyroxine (SYNTHROID/LEVOTHROID) 50 MCG tablet [Pharmacy Med Name: LEVOTHYROXINE SODIUM 50MCG TABS] 90 tablet 0     Sig: TAKE ONE TABLET BY MOUTH EVERY DAY       Thyroid Protocol Failed - 10/30/2019 11:09 AM        Failed - Normal TSH on file in past 12 months     Recent Labs   Lab Test 10/09/18  1201   TSH 0.92              Passed - Patient is 12 years or older        Passed - Recent (12 mo) or future (30 days) visit within the authorizing provider's specialty     Patient has had an office visit with the authorizing provider or a provider within the authorizing providers department within the previous 12 mos or has a future within next 30 days. See \"Patient Info\" tab in inbasket, or \"Choose Columns\" in Meds & Orders section of the refill encounter.              Passed - Medication is active on med list        Passed - No active pregnancy on record     If patient is pregnant or has had a positive pregnancy test, please check TSH.          Passed - No positive pregnancy test in past 12 months     If patient is pregnant or has had a positive pregnancy test, please check TSH.          fluticasone (FLOVENT HFA) 110 MCG/ACT inhaler [Pharmacy Med Name: FLOVENT HFA 110MCG/ACT AERO] 36 g 2     Sig: INHALE TWO PUFFS INTO THE LUNGS TWICE A DAY       Inhaled Steroids Protocol Failed - 10/30/2019 11:09 AM        Failed - " "Asthma control assessment score within normal limits in last 6 months     Please review ACT score.           Passed - Patient is age 12 or older        Passed - Medication is active on med list        Passed - Recent (6 mo) or future (30 days) visit within the authorizing provider's specialty     Patient had office visit in the last 6 months or has a visit in the next 30 days with authorizing provider or within the authorizing provider's specialty.  See \"Patient Info\" tab in inbasket, or \"Choose Columns\" in Meds & Orders section of the refill encounter.              "

## 2019-10-31 NOTE — TELEPHONE ENCOUNTER
Pt is due for annual OV. Refilled 30 day supply with note to pharmacy to inform patient to schedule an appointment     Jayesh ISLAS RN

## 2019-12-05 DIAGNOSIS — I10 HTN (HYPERTENSION), BENIGN: ICD-10-CM

## 2019-12-05 DIAGNOSIS — E03.9 HYPOTHYROIDISM, UNSPECIFIED TYPE: ICD-10-CM

## 2019-12-05 NOTE — TELEPHONE ENCOUNTER
"levothyroxine (SYNTHROID/LEVOTHROID) 50 MCG tablet  Last Written Prescription Date:  10/31/19  Last Fill Quantity: 30 tablet,  # refills: 0   Last office visit: 5/6/2019 with prescribing provider:  Rosa Chaudhari Office Visit:      hydrochlorothiazide (MICROZIDE) 12.5 MG capsule  Last Written Prescription Date:  10/31/19  Last Fill Quantity: 30 capsule,  # refills: 0   Last office visit: 5/6/2019 with prescribing provider:  Rosa Chaudhari Office Visit:      Requested Prescriptions   Pending Prescriptions Disp Refills     levothyroxine (SYNTHROID/LEVOTHROID) 50 MCG tablet [Pharmacy Med Name: LEVOTHYROXINE SODIUM 50MCG TABS] 30 tablet 0     Sig: TAKE ONE TABLET BY MOUTH ONCE DAILY       Thyroid Protocol Failed - 12/5/2019  2:39 PM        Failed - Normal TSH on file in past 12 months     Recent Labs   Lab Test 10/09/18  1201   TSH 0.92              Passed - Patient is 12 years or older        Passed - Recent (12 mo) or future (30 days) visit within the authorizing provider's specialty     Patient has had an office visit with the authorizing provider or a provider within the authorizing providers department within the previous 12 mos or has a future within next 30 days. See \"Patient Info\" tab in inbasket, or \"Choose Columns\" in Meds & Orders section of the refill encounter.              Passed - Medication is active on med list        Passed - No active pregnancy on record     If patient is pregnant or has had a positive pregnancy test, please check TSH.          Passed - No positive pregnancy test in past 12 months     If patient is pregnant or has had a positive pregnancy test, please check TSH.          hydrochlorothiazide (MICROZIDE) 12.5 MG capsule [Pharmacy Med Name: HYDROCHLOROTHIAZIDE 12.5MG CAPS] 30 capsule 0     Sig: TAKE ONE CAPSULE BY MOUTH ONCE DAILY       Diuretics (Including Combos) Protocol Failed - 12/5/2019  2:39 PM        Failed - Normal serum creatinine on file in past 12 months     Recent Labs   Lab " "Test 10/09/18  1201   CR 0.67              Failed - Normal serum potassium on file in past 12 months     Recent Labs   Lab Test 10/09/18  1201   POTASSIUM 3.9                    Failed - Normal serum sodium on file in past 12 months     Recent Labs   Lab Test 10/09/18  1201                 Passed - Blood pressure under 140/90 in past 12 months     BP Readings from Last 3 Encounters:   05/06/19 138/60   10/09/18 128/65   10/05/18 134/72                 Passed - Recent (12 mo) or future (30 days) visit within the authorizing provider's specialty     Patient has had an office visit with the authorizing provider or a provider within the authorizing providers department within the previous 12 mos or has a future within next 30 days. See \"Patient Info\" tab in inbasket, or \"Choose Columns\" in Meds & Orders section of the refill encounter.              Passed - Medication is active on med list        Passed - Patient is age 18 or older        Passed - No active pregancy on record        Passed - No positive pregnancy test in past 12 months          "

## 2019-12-06 RX ORDER — LEVOTHYROXINE SODIUM 50 UG/1
TABLET ORAL
Qty: 30 TABLET | Refills: 0 | Status: SHIPPED | OUTPATIENT
Start: 2019-12-06 | End: 2019-12-18

## 2019-12-06 RX ORDER — HYDROCHLOROTHIAZIDE 12.5 MG/1
CAPSULE ORAL
Qty: 30 CAPSULE | Refills: 0 | Status: SHIPPED | OUTPATIENT
Start: 2019-12-06 | End: 2019-12-18

## 2019-12-06 NOTE — TELEPHONE ENCOUNTER
Patient due for appt and labs    Next 5 appointments (look out 90 days)    Dec 18, 2019  2:00 PM CST  [facility] Mal[facility]-C  [facility] [address] [facility] [address]  [phone]        Prescription approved per [facility] Refill Protocol.  Nelly MUNSON RN

## 2019-12-18 ENCOUNTER — OFFICE VISIT (OUTPATIENT)
Dept: FAMILY MEDICINE | Facility: CLINIC | Age: 84
End: 2019-12-18
Payer: COMMERCIAL

## 2019-12-18 VITALS
BODY MASS INDEX: 25.4 KG/M2 | HEIGHT: 62 IN | SYSTOLIC BLOOD PRESSURE: 128 MMHG | DIASTOLIC BLOOD PRESSURE: 76 MMHG | TEMPERATURE: 97.2 F | HEART RATE: 64 BPM | WEIGHT: 138 LBS | OXYGEN SATURATION: 99 %

## 2019-12-18 DIAGNOSIS — E03.9 HYPOTHYROIDISM, UNSPECIFIED TYPE: ICD-10-CM

## 2019-12-18 DIAGNOSIS — Z00.00 ENCOUNTER FOR MEDICARE ANNUAL WELLNESS EXAM: Primary | ICD-10-CM

## 2019-12-18 DIAGNOSIS — J45.40 MODERATE PERSISTENT ASTHMA WITHOUT COMPLICATION: ICD-10-CM

## 2019-12-18 DIAGNOSIS — I10 HTN (HYPERTENSION), BENIGN: ICD-10-CM

## 2019-12-18 LAB
ERYTHROCYTE [DISTWIDTH] IN BLOOD BY AUTOMATED COUNT: 13 % (ref 10–15)
HCT VFR BLD AUTO: 36.7 % (ref 35–47)
HGB BLD-MCNC: 12 G/DL (ref 11.7–15.7)
MCH RBC QN AUTO: 30.6 PG (ref 26.5–33)
MCHC RBC AUTO-ENTMCNC: 32.7 G/DL (ref 31.5–36.5)
MCV RBC AUTO: 94 FL (ref 78–100)
PLATELET # BLD AUTO: 229 10E9/L (ref 150–450)
RBC # BLD AUTO: 3.92 10E12/L (ref 3.8–5.2)
WBC # BLD AUTO: 5.2 10E9/L (ref 4–11)

## 2019-12-18 PROCEDURE — 99397 PER PM REEVAL EST PAT 65+ YR: CPT | Performed by: PHYSICIAN ASSISTANT

## 2019-12-18 PROCEDURE — 85027 COMPLETE CBC AUTOMATED: CPT | Performed by: PHYSICIAN ASSISTANT

## 2019-12-18 PROCEDURE — 84443 ASSAY THYROID STIM HORMONE: CPT | Performed by: PHYSICIAN ASSISTANT

## 2019-12-18 PROCEDURE — 80053 COMPREHEN METABOLIC PANEL: CPT | Performed by: PHYSICIAN ASSISTANT

## 2019-12-18 PROCEDURE — 36415 COLL VENOUS BLD VENIPUNCTURE: CPT | Performed by: PHYSICIAN ASSISTANT

## 2019-12-18 RX ORDER — ALBUTEROL SULFATE 0.83 MG/ML
2.5 SOLUTION RESPIRATORY (INHALATION) ONCE
Qty: 3 ML | Refills: 3 | Status: SHIPPED | OUTPATIENT
Start: 2019-12-18 | End: 2019-12-18

## 2019-12-18 RX ORDER — HYDROCHLOROTHIAZIDE 12.5 MG/1
CAPSULE ORAL
Qty: 90 CAPSULE | Refills: 3 | Status: SHIPPED | OUTPATIENT
Start: 2019-12-18

## 2019-12-18 RX ORDER — LEVOTHYROXINE SODIUM 50 UG/1
50 TABLET ORAL DAILY
Qty: 90 TABLET | Refills: 3 | Status: SHIPPED | OUTPATIENT
Start: 2019-12-18

## 2019-12-18 RX ORDER — FLUTICASONE PROPIONATE 110 UG/1
AEROSOL, METERED RESPIRATORY (INHALATION)
Qty: 36 G | Refills: 3 | Status: SHIPPED | OUTPATIENT
Start: 2019-12-18 | End: 2020-08-25

## 2019-12-18 RX ORDER — ALBUTEROL SULFATE 90 UG/1
2 AEROSOL, METERED RESPIRATORY (INHALATION) EVERY 4 HOURS PRN
Qty: 18 G | Refills: 3 | Status: SHIPPED | OUTPATIENT
Start: 2019-12-18 | End: 2020-05-12

## 2019-12-18 ASSESSMENT — MIFFLIN-ST. JEOR: SCORE: 1026.27

## 2019-12-18 ASSESSMENT — ACTIVITIES OF DAILY LIVING (ADL): CURRENT_FUNCTION: NO ASSISTANCE NEEDED

## 2019-12-18 NOTE — LETTER
[facility]  [address] [facility]  [address]  [place]  [address]  Tel: [phone]    December 19, 2019    Isaura Weathers  [address]  [facility] [address]        Dear Ms. Weathers,    Your thyroid test was in normal range.   Please continue your current dose of synthroid.    Your electrolytes, kidney, liver test and blood sugar were normal.  Your hemoglobin and white blood cell counts are normal.    Malissa Lee PA-C/SHANNON        Enclosure: Lab Results  Results for orders placed or performed in visit on 12/18/19   TSH with free T4 reflex     Status: None   Result Value Ref Range    TSH 1.44 0.40 - 4.00 mU/L   Comprehensive metabolic panel     Status: Abnormal   Result Value Ref Range    Sodium 134 133 - 144 mmol/L    Potassium 3.9 3.4 - 5.3 mmol/L    Chloride 101 94 - 109 mmol/L    Carbon Dioxide 32 20 - 32 mmol/L    Anion Gap 1 (L) 3 - 14 mmol/L    Glucose 88 70 - 99 mg/dL    Urea Nitrogen 9 7 - 30 mg/dL    Creatinine 0.65 0.52 - 1.04 mg/dL    GFR Estimate 82 >60 mL/min/[1.73_m2]    GFR Estimate If Black >90 >60 mL/min/[1.73_m2]    Calcium 8.9 8.5 - 10.1 mg/dL    Bilirubin Total 0.8 0.2 - 1.3 mg/dL    Albumin 3.4 3.4 - 5.0 g/dL    Protein Total 6.9 6.8 - 8.8 g/dL    Alkaline Phosphatase 72 40 - 150 U/L    ALT 10 0 - 50 U/L    AST 12 0 - 45 U/L   CBC with platelets     Status: None   Result Value Ref Range    WBC 5.2 4.0 - 11.0 10e9/L    RBC Count 3.92 3.8 - 5.2 10e12/L    Hemoglobin 12.0 11.7 - 15.7 g/dL    Hematocrit 36.7 35.0 - 47.0 %    MCV 94 78 - 100 fl    MCH 30.6 26.5 - 33.0 pg    MCHC 32.7 31.5 - 36.5 g/dL    RDW 13.0 10.0 - 15.0 %    Platelet Count 229 150 - 450 10e9/L

## 2019-12-18 NOTE — PROGRESS NOTES
"SUBJECTIVE:   Isaura Weathers is a 84 year old female who presents for Preventive Visit.    Pt refuses immunizations and mammogram.  Asthma: pt notes this stable pascual if she avoids the cold air  Are you in the first 12 months of your Medicare coverage?  No    Healthy Habits:     In general, how would you rate your overall health?  Good    Frequency of exercise:  None    Do you usually eat at least 4 servings of fruit and vegetables a day, include whole grains    & fiber and avoid regularly eating high fat or \"junk\" foods?  No (1-2)    Taking medications regularly:  Yes    Barriers to taking medications:  None    Medication side effects:  None    Ability to successfully perform activities of daily living:  No assistance needed    Home Safety:  No safety concerns identified    Hearing impairment symptoms: bilateral hearing aids.    In the past 6 months, have you been bothered by leaking of urine? Yes    In general, how would you rate your overall mental or emotional health?  Fair      PHQ-2 Total Score: 0    Additional concerns today:  No    Do you feel safe in your environment? YES    Have you ever done Advance Care Planning? (For example, a Health Directive, POLST, or a discussion with a medical provider or your loved ones about your wishes): Yes, advance care planning is on file.    Fall risk  Fallen 2 or more times in the past year?: No  Any fall with injury in the past year?: No    Cognitive Screening   1) Repeat 3 items (Leader, Season, Table)    2) Clock draw: NORMAL  3) 3 item recall: Recalls 2 objects   Results: NORMAL clock, 2 items recalled: COGNITIVE IMPAIRMENT LESS LIKELY    Mini-CogTM Copyright RUEL Schwartz. Licensed by the author for use in [facility]; reprinted with permission (jv@.[facility]). All rights reserved.      Do you have sleep apnea, excessive snoring or daytime drowsiness?: no    Reviewed and updated as needed this visit by clinical staff  Tobacco  Allergies  Meds         Reviewed " and updated as needed this visit by Provider        Social History     Tobacco Use     Smoking status: Never Smoker     Smokeless tobacco: Never Used   Substance Use Topics     Alcohol use: Yes     Alcohol/week: 0.0 - 1.0 standard drinks     Comment: one/d     If you drink alcohol do you typically have >3 drinks per day or >7 drinks per week? No    Alcohol Use 12/18/2019   Prescreen: >3 drinks/day or >7 drinks/week? No             Current providers sharing in care for this patient include:   Patient Care Team:  Malissa Lee PA-C as PCP - General (Internal Medicine)  Malissa Lee PA-C as Assigned PCP    The following health maintenance items are reviewed in Epic and correct as of today:  Health Maintenance   Topic Date Due     DEXA  1935     DTAP/TDAP/TD IMMUNIZATION (1 - Tdap) 10/06/1947     ZOSTER IMMUNIZATION (1 of 2) 10/06/1986     MEDICARE ANNUAL WELLNESS VISIT  10/06/2001     PNEUMOCOCCAL IMMUNIZATION 65+ LOW/MEDIUM RISK (1 of 2 - PCV13) 10/06/2001     ASTHMA ACTION PLAN  02/15/2018     ASTHMA CONTROL TEST  06/18/2020     FALL RISK ASSESSMENT  12/18/2020     ADVANCE CARE PLANNING  05/09/2024     PHQ-2  Completed     IPV IMMUNIZATION  Aged Out     MENINGITIS IMMUNIZATION  Aged Out       Past Medical History:   Diagnosis Date     Ankle fracture      HTN (hypertension), benign      Hyperlipidemia LDL goal < 130 1/27/2014     Hypothyroid      Moderate persistent asthma      Pneumonia      Rib fracture     from coughing     SBO (small bowel obstruction) (H) 2/2016    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     Past Surgical History:   Procedure Laterality Date     LAPAROSCOPY DIAGNOSTIC (GENERAL) N/A 2/22/2016    Procedure: LAPAROSCOPY DIAGNOSTIC (GENERAL);  Surgeon: Phill Hansen MD;  Location:  OR     LAPAROTOMY, LYSIS ADHESIONS, COMBINED N/A 2/22/2016    Procedure: COMBINED LAPAROTOMY, LYSIS ADHESIONS;  Surgeon: Phill Hansen MD;  Location:  OR     RESECT SMALL BOWEL  "WITHOUT OSTOMY N/A 2/22/2016    Procedure: RESECT SMALL BOWEL WITHOUT OSTOMY;  Surgeon: Phill Hansen MD;  Location:  OR     Current Outpatient Medications   Medication Sig Dispense Refill     albuterol (PROAIR HFA/PROVENTIL HFA/VENTOLIN HFA) 108 (90 Base) MCG/ACT inhaler Inhale 2 puffs into the lungs every 4 hours as needed for shortness of breath / dyspnea or wheezing 18 g 3     albuterol (PROVENTIL) (2.5 MG/3ML) 0.083% neb solution Take 1 vial (2.5 mg) by nebulization once for 1 dose 3 mL 3     atenolol (TENORMIN) 50 MG tablet TAKE ONE TABLET BY MOUTH EVERY DAY 90 tablet 1     fluticasone (FLOVENT HFA) 110 MCG/ACT inhaler INHALE TWO PUFFS INTO THE LUNGS TWICE A DAY- 36 g 3     hydrochlorothiazide (MICROZIDE) 12.5 MG capsule TAKE ONE CAPSULE BY MOUTH ONCE DAILY 90 capsule 3     levothyroxine (SYNTHROID/LEVOTHROID) 50 MCG tablet Take 1 tablet (50 mcg) by mouth daily 90 tablet 3         Review of Systems  Constitutional, HEENT, cardiovascular, pulmonary, GI, , musculoskeletal, neuro, skin, endocrine and psych systems are negative, except as otherwise noted.    OBJECTIVE:   /76 (BP Location: Right arm, Patient Position: Sitting, Cuff Size: Adult Regular)   Pulse 64   Temp 97.2  F (36.2  C) (Oral)   Ht 1.562 m (5' 1.5\")   Wt 62.6 kg (138 lb)   SpO2 99%   Breastfeeding No   BMI 25.65 kg/m   Estimated body mass index is 25.65 kg/m  as calculated from the following:    Height as of this encounter: 1.562 m (5' 1.5\").    Weight as of this encounter: 62.6 kg (138 lb).  Physical Exam  GENERAL APPEARANCE: healthy, alert and no distress  EYES: Eyes grossly normal to inspection, PERRL and conjunctivae and sclerae normal  HENT: ear canals and TM's normal, nose and mouth without ulcers or lesions, oropharynx clear and oral mucous membranes moist  NECK: no adenopathy, no asymmetry, masses, or scars and thyroid normal to palpation  RESP: lungs clear to auscultation - no rales, rhonchi or wheezes  BREAST: pt " "declined  CV: regular rate and rhythm, normal S1 S2, no S3 or S4, no murmur, click or rub, no peripheral edema and peripheral pulses strong  ABDOMEN: soft, nontender, no hepatosplenomegaly, no masses and bowel sounds normal  MS: no musculoskeletal defects are noted and gait is age appropriate without ataxia  SKIN: no suspicious lesions or rashes  NEURO: Normal strength and tone, sensory exam grossly normal, mentation intact and speech normal  PSYCH: mentation appears normal and affect normal/bright        ASSESSMENT / PLAN:   Assessment and Plan:     (Z00.00) Encounter for Medicare annual wellness exam  (primary encounter diagnosis)  Comment:   Plan: pt declines any immunization or mammogram.      (E03.9) Hypothyroidism, unspecified type  Comment:   Plan: levothyroxine (SYNTHROID/LEVOTHROID) 50 MCG         tablet, TSH with free T4 reflex            (I10) HTN (hypertension), benign  Comment: well controlled, cont same.  Plan: hydrochlorothiazide (MICROZIDE) 12.5 MG         capsule, Comprehensive metabolic panel, CBC         with platelets            (J45.40) Moderate persistent asthma without complication  Comment: stable  Plan: refilled inhalers        COUNSELING:  Reviewed preventive health counseling, as reflected in patient instructions    Estimated body mass index is 25.65 kg/m  as calculated from the following:    Height as of this encounter: 1.562 m (5' 1.5\").    Weight as of this encounter: 62.6 kg (138 lb).         reports that she has never smoked. She has never used smokeless tobacco.      Appropriate preventive services were discussed with this patient, including applicable screening as appropriate for cardiovascular disease, diabetes, osteopenia/osteoporosis, and glaucoma.  As appropriate for age/gender, discussed screening for colorectal cancer, prostate cancer, breast cancer, and cervical cancer. Checklist reviewing preventive services available has been given to the patient.    Reviewed patients plan " of care and provided an AVS. The Basic Care Plan (routine screening as documented in Health Maintenance) for Isaura meets the Care Plan requirement. This Care Plan has been established and reviewed with the Patient.    Counseling Resources:  ATP IV Guidelines  Pooled Cohorts Equation Calculator  Breast Cancer Risk Calculator  FRAX Risk Assessment  ICSI Preventive Guidelines  Dietary Guidelines for Americans, 2010  USDA's MyPlate  ASA Prophylaxis  Lung CA Screening    Malissa Lee PA-C  [facility]    Identified Health Risks:

## 2019-12-19 LAB
ALBUMIN SERPL-MCNC: 3.4 G/DL (ref 3.4–5)
ALP SERPL-CCNC: 72 U/L (ref 40–150)
ALT SERPL W P-5'-P-CCNC: 10 U/L (ref 0–50)
ANION GAP SERPL CALCULATED.3IONS-SCNC: 1 MMOL/L (ref 3–14)
AST SERPL W P-5'-P-CCNC: 12 U/L (ref 0–45)
BILIRUB SERPL-MCNC: 0.8 MG/DL (ref 0.2–1.3)
BUN SERPL-MCNC: 9 MG/DL (ref 7–30)
CALCIUM SERPL-MCNC: 8.9 MG/DL (ref 8.5–10.1)
CHLORIDE SERPL-SCNC: 101 MMOL/L (ref 94–109)
CO2 SERPL-SCNC: 32 MMOL/L (ref 20–32)
CREAT SERPL-MCNC: 0.65 MG/DL (ref 0.52–1.04)
GFR SERPL CREATININE-BSD FRML MDRD: 82 ML/MIN/{1.73_M2}
GLUCOSE SERPL-MCNC: 88 MG/DL (ref 70–99)
POTASSIUM SERPL-SCNC: 3.9 MMOL/L (ref 3.4–5.3)
PROT SERPL-MCNC: 6.9 G/DL (ref 6.8–8.8)
SODIUM SERPL-SCNC: 134 MMOL/L (ref 133–144)
TSH SERPL DL<=0.005 MIU/L-ACNC: 1.44 MU/L (ref 0.4–4)

## 2019-12-19 ASSESSMENT — ASTHMA QUESTIONNAIRES: ACT_TOTALSCORE: 22

## 2019-12-19 NOTE — RESULT ENCOUNTER NOTE
Isaura,    Your thyroid test was in normal range.   Please continue your current dose of synthroid.    Your electrolytes, kidney, liver test and blood sugar were normal.  Your hemoglobin and white blood cell counts are normal.    Malissa Lee PA-C

## 2020-04-06 DIAGNOSIS — I10 HTN (HYPERTENSION), BENIGN: ICD-10-CM

## 2020-04-06 RX ORDER — ATENOLOL 50 MG/1
TABLET ORAL
Qty: 90 TABLET | Refills: 1 | Status: SHIPPED | OUTPATIENT
Start: 2020-04-06

## 2020-04-06 NOTE — TELEPHONE ENCOUNTER
"atenolol (TENORMIN) 50 MG tablet  90 tablet  1  10/22/2019          Last Written Prescription Date:  10/22/2019  Last Fill Quantity: 90,  # refills: 1   Last office visit: 12/18/2019 with prescribing provider:  Malissa Lee   Future Office Visit:  Unknown      Requested Prescriptions   Pending Prescriptions Disp Refills     atenolol (TENORMIN) 50 MG tablet [Pharmacy Med Name: ATENOLOL 50MG TABS] 90 tablet 1     Sig: TAKE ONE TABLET BY MOUTH EVERY DAY       Beta-Blockers Protocol Passed - 4/6/2020 11:25 AM        Passed - Blood pressure under 140/90 in past 12 months     BP Readings from Last 3 Encounters:   12/18/19 128/76   05/06/19 138/60   10/09/18 128/65                 Passed - Patient is age 6 or older        Passed - Recent (12 mo) or future (30 days) visit within the authorizing provider's specialty     Patient has had an office visit with the authorizing provider or a provider within the authorizing providers department within the previous 12 mos or has a future within next 30 days. See \"Patient Info\" tab in inbasket, or \"Choose Columns\" in Meds & Orders section of the refill encounter.              Passed - Medication is active on med list             "

## 2020-07-08 DIAGNOSIS — J45.40 MODERATE PERSISTENT ASTHMA WITHOUT COMPLICATION: ICD-10-CM

## 2020-07-09 RX ORDER — ALBUTEROL SULFATE 90 UG/1
AEROSOL, METERED RESPIRATORY (INHALATION)
Qty: 18 G | Refills: 1 | Status: SHIPPED | OUTPATIENT
Start: 2020-07-09 | End: 2020-08-25

## 2020-07-09 NOTE — TELEPHONE ENCOUNTER
Routing refill request to provider for review/approval because:  Drug interaction warning  Also due for ACT- mailed to pt.  Please authorize if appropriate.  Thanks,  Sherley Marcos RN

## 2020-08-25 DIAGNOSIS — J45.40 MODERATE PERSISTENT ASTHMA WITHOUT COMPLICATION: ICD-10-CM

## 2020-08-25 RX ORDER — ALBUTEROL SULFATE 90 UG/1
AEROSOL, METERED RESPIRATORY (INHALATION)
Qty: 18 G | Refills: 1 | Status: SHIPPED | OUTPATIENT
Start: 2020-08-25

## 2020-08-25 RX ORDER — DEXAMETHASONE 4 MG/1
TABLET ORAL
Qty: 36 G | Refills: 3 | Status: SHIPPED | OUTPATIENT
Start: 2020-08-25

## 2020-08-25 NOTE — TELEPHONE ENCOUNTER
Reason for Call:  Medication or medication refill:    Do you use a [facility]?  Name of the pharmacy and phone number for the current request:  [facility] [place], [place] - [address] [address]     Name of the medication requested: VENTOLIN  (90 Base) MCG/ACT inhaler  fluticasone (FLOVENT HFA) 110 MCG/ACT inhaler       Other request: Pt stop by this afternoon needed to get her Inhaler refills. Pt stated that she she running out soon.     Can we leave a detailed message on this number? YES    Phone number patient can be reached at: Home number on file [phone] (home)    Best Time: anytime    Call taken on 8/25/2020 at 2:41 PM by Wendy Woodard

## 2020-08-25 NOTE — TELEPHONE ENCOUNTER
Routing refill request to provider for review/approval because:  Labs not current:  ACT  Patient needs to be seen because:  Patient has not been seen within the last 6 months.    ALENA KennedyN, RN  [employer] [facility]

## 2020-10-07 ENCOUNTER — TELEPHONE (OUTPATIENT)
Dept: FAMILY MEDICINE | Facility: CLINIC | Age: 85
End: 2020-10-07

## 2020-10-07 NOTE — TELEPHONE ENCOUNTER
Reason for Call:  Other call back    Detailed comments: Patient passed away on 10/02/2020. Please call the Medical Examiners Office at [phone], any investigator can help    Phone Number Patient can be reached at: Other phone number:  Medical Examiners Office- [phone]    Best Time:     Can we leave a detailed message on this number? Not Applicable    Call taken on 10/7/2020 at 9:44 AM by Bailee Garsia